# Patient Record
Sex: MALE | Race: WHITE | Employment: OTHER | URBAN - METROPOLITAN AREA
[De-identification: names, ages, dates, MRNs, and addresses within clinical notes are randomized per-mention and may not be internally consistent; named-entity substitution may affect disease eponyms.]

---

## 2019-01-18 ENCOUNTER — HOSPITAL ENCOUNTER (EMERGENCY)
Age: 39
Discharge: HOME | End: 2019-01-18
Attending: EMERGENCY MEDICINE
Payer: COMMERCIAL

## 2019-01-18 ENCOUNTER — APPOINTMENT (OUTPATIENT)
Dept: ULTRASOUND IMAGING | Age: 39
End: 2019-01-18
Payer: COMMERCIAL

## 2019-01-18 VITALS
HEART RATE: 107 BPM | RESPIRATION RATE: 16 BRPM | OXYGEN SATURATION: 99 % | TEMPERATURE: 98.1 F | WEIGHT: 153.4 LBS | SYSTOLIC BLOOD PRESSURE: 152 MMHG | DIASTOLIC BLOOD PRESSURE: 108 MMHG | BODY MASS INDEX: 20.33 KG/M2 | HEIGHT: 73 IN

## 2019-01-18 DIAGNOSIS — N50.89 TESTICULAR SWELLING, RIGHT: ICD-10-CM

## 2019-01-18 DIAGNOSIS — N45.1 EPIDIDYMITIS: Primary | ICD-10-CM

## 2019-01-18 PROCEDURE — 93975 VASCULAR STUDY: CPT

## 2019-01-18 PROCEDURE — 76870 US EXAM SCROTUM: CPT

## 2019-01-18 PROCEDURE — 99284 EMERGENCY DEPT VISIT MOD MDM: CPT

## 2019-01-18 PROCEDURE — 6370000000 HC RX 637 (ALT 250 FOR IP): Performed by: EMERGENCY MEDICINE

## 2019-01-18 RX ORDER — LEVOFLOXACIN 500 MG/1
500 TABLET, FILM COATED ORAL DAILY
Qty: 10 TABLET | Refills: 0 | Status: SHIPPED | OUTPATIENT
Start: 2019-01-18 | End: 2019-01-28

## 2019-01-18 RX ORDER — OXYCODONE HYDROCHLORIDE 5 MG/1
5 TABLET ORAL ONCE
Status: COMPLETED | OUTPATIENT
Start: 2019-01-18 | End: 2019-01-18

## 2019-01-18 RX ORDER — OXYCODONE HYDROCHLORIDE 5 MG/1
5 TABLET ORAL EVERY 6 HOURS PRN
Qty: 8 TABLET | Refills: 0 | Status: SHIPPED | OUTPATIENT
Start: 2019-01-18 | End: 2019-01-20

## 2019-01-18 RX ADMIN — IBUPROFEN 600 MG: 400 TABLET ORAL at 18:38

## 2019-01-18 RX ADMIN — OXYCODONE HYDROCHLORIDE 5 MG: 5 TABLET ORAL at 18:38

## 2019-01-18 ASSESSMENT — PAIN SCALES - GENERAL
PAINLEVEL_OUTOF10: 9
PAINLEVEL_OUTOF10: 9

## 2019-01-18 ASSESSMENT — PAIN DESCRIPTION - LOCATION: LOCATION: GROIN

## 2019-01-18 ASSESSMENT — PAIN DESCRIPTION - PAIN TYPE: TYPE: ACUTE PAIN

## 2019-01-18 ASSESSMENT — PAIN DESCRIPTION - DESCRIPTORS: DESCRIPTORS: THROBBING;SHARP

## 2019-01-18 ASSESSMENT — PAIN DESCRIPTION - FREQUENCY: FREQUENCY: CONTINUOUS

## 2019-01-24 ASSESSMENT — ENCOUNTER SYMPTOMS
ABDOMINAL PAIN: 0
BACK PAIN: 0

## 2019-07-12 ENCOUNTER — HOSPITAL ENCOUNTER (EMERGENCY)
Age: 39
Discharge: LEFT BEFORE DISCHARGE | End: 2019-07-12
Attending: EMERGENCY MEDICINE
Payer: COMMERCIAL

## 2019-07-12 ENCOUNTER — APPOINTMENT (OUTPATIENT)
Dept: CT IMAGING | Age: 39
End: 2019-07-12
Payer: COMMERCIAL

## 2019-07-12 VITALS
HEART RATE: 108 BPM | TEMPERATURE: 98.4 F | BODY MASS INDEX: 20.36 KG/M2 | DIASTOLIC BLOOD PRESSURE: 110 MMHG | OXYGEN SATURATION: 100 % | WEIGHT: 153.6 LBS | RESPIRATION RATE: 18 BRPM | HEIGHT: 73 IN | SYSTOLIC BLOOD PRESSURE: 175 MMHG

## 2019-07-12 DIAGNOSIS — A41.9 SEPSIS, DUE TO UNSPECIFIED ORGANISM: Primary | ICD-10-CM

## 2019-07-12 DIAGNOSIS — N12 PYELONEPHRITIS: ICD-10-CM

## 2019-07-12 DIAGNOSIS — N41.1 CHRONIC PROSTATITIS: ICD-10-CM

## 2019-07-12 LAB
A/G RATIO: 1.1 (ref 1.1–2.2)
ALBUMIN SERPL-MCNC: 4.7 G/DL (ref 3.4–5)
ALP BLD-CCNC: 97 U/L (ref 40–129)
ALT SERPL-CCNC: 29 U/L (ref 10–40)
ANION GAP SERPL CALCULATED.3IONS-SCNC: 29 MMOL/L (ref 3–16)
ANISOCYTOSIS: ABNORMAL
AST SERPL-CCNC: 74 U/L (ref 15–37)
BACTERIA: ABNORMAL /HPF
BASOPHILS ABSOLUTE: 0 K/UL (ref 0–0.2)
BASOPHILS RELATIVE PERCENT: 0.5 %
BILIRUB SERPL-MCNC: 1.6 MG/DL (ref 0–1)
BILIRUBIN URINE: NEGATIVE
BLOOD, URINE: NEGATIVE
BUN BLDV-MCNC: 9 MG/DL (ref 7–20)
CALCIUM SERPL-MCNC: 9.4 MG/DL (ref 8.3–10.6)
CHLORIDE BLD-SCNC: 91 MMOL/L (ref 99–110)
CLARITY: CLEAR
CO2: 13 MMOL/L (ref 21–32)
COLOR: ABNORMAL
CREAT SERPL-MCNC: 0.6 MG/DL (ref 0.9–1.3)
EOSINOPHILS ABSOLUTE: 0 K/UL (ref 0–0.6)
EOSINOPHILS RELATIVE PERCENT: 0.7 %
EPITHELIAL CELLS, UA: ABNORMAL /HPF
GFR AFRICAN AMERICAN: >60
GFR NON-AFRICAN AMERICAN: >60
GLOBULIN: 4.1 G/DL
GLUCOSE BLD-MCNC: 87 MG/DL (ref 70–99)
GLUCOSE URINE: NEGATIVE MG/DL
HCT VFR BLD CALC: 47.2 % (ref 40.5–52.5)
HEMATOLOGY PATH CONSULT: YES
HEMOGLOBIN: 15.4 G/DL (ref 13.5–17.5)
KETONES, URINE: >=80 MG/DL
LACTIC ACID, SEPSIS: 5.3 MMOL/L (ref 0.4–1.9)
LEUKOCYTE ESTERASE, URINE: ABNORMAL
LIPASE: 35 U/L (ref 13–60)
LYMPHOCYTES ABSOLUTE: 0.7 K/UL (ref 1–5.1)
LYMPHOCYTES RELATIVE PERCENT: 10.8 %
MACROCYTES: ABNORMAL
MCH RBC QN AUTO: 36.5 PG (ref 26–34)
MCHC RBC AUTO-ENTMCNC: 32.7 G/DL (ref 31–36)
MCV RBC AUTO: 111.4 FL (ref 80–100)
MICROSCOPIC EXAMINATION: YES
MONOCYTES ABSOLUTE: 0.4 K/UL (ref 0–1.3)
MONOCYTES RELATIVE PERCENT: 5.5 %
NEUTROPHILS ABSOLUTE: 5.6 K/UL (ref 1.7–7.7)
NEUTROPHILS RELATIVE PERCENT: 82.5 %
NITRITE, URINE: NEGATIVE
PDW BLD-RTO: 15.6 % (ref 12.4–15.4)
PH UA: 6 (ref 5–8)
PLATELET # BLD: 123 K/UL (ref 135–450)
PLATELET SLIDE REVIEW: ABNORMAL
PMV BLD AUTO: 7.9 FL (ref 5–10.5)
POTASSIUM REFLEX MAGNESIUM: 4.3 MMOL/L (ref 3.5–5.1)
PROTEIN UA: 30 MG/DL
RBC # BLD: 4.24 M/UL (ref 4.2–5.9)
RBC UA: ABNORMAL /HPF (ref 0–2)
SODIUM BLD-SCNC: 133 MMOL/L (ref 136–145)
SPECIFIC GRAVITY UA: >=1.03 (ref 1–1.03)
TOTAL PROTEIN: 8.8 G/DL (ref 6.4–8.2)
URINE TYPE: ABNORMAL
UROBILINOGEN, URINE: 1 E.U./DL
WBC # BLD: 6.7 K/UL (ref 4–11)
WBC UA: ABNORMAL /HPF (ref 0–5)

## 2019-07-12 PROCEDURE — 96367 TX/PROPH/DG ADDL SEQ IV INF: CPT

## 2019-07-12 PROCEDURE — 87491 CHLMYD TRACH DNA AMP PROBE: CPT

## 2019-07-12 PROCEDURE — 87086 URINE CULTURE/COLONY COUNT: CPT

## 2019-07-12 PROCEDURE — 74176 CT ABD & PELVIS W/O CONTRAST: CPT

## 2019-07-12 PROCEDURE — 6360000002 HC RX W HCPCS: Performed by: EMERGENCY MEDICINE

## 2019-07-12 PROCEDURE — 87186 SC STD MICRODIL/AGAR DIL: CPT

## 2019-07-12 PROCEDURE — 96365 THER/PROPH/DIAG IV INF INIT: CPT

## 2019-07-12 PROCEDURE — 80053 COMPREHEN METABOLIC PANEL: CPT

## 2019-07-12 PROCEDURE — 83690 ASSAY OF LIPASE: CPT

## 2019-07-12 PROCEDURE — 85025 COMPLETE CBC W/AUTO DIFF WBC: CPT

## 2019-07-12 PROCEDURE — 81001 URINALYSIS AUTO W/SCOPE: CPT

## 2019-07-12 PROCEDURE — 83605 ASSAY OF LACTIC ACID: CPT

## 2019-07-12 PROCEDURE — 87591 N.GONORRHOEAE DNA AMP PROB: CPT

## 2019-07-12 PROCEDURE — 87077 CULTURE AEROBIC IDENTIFY: CPT

## 2019-07-12 PROCEDURE — 87184 SC STD DISK METHOD PER PLATE: CPT

## 2019-07-12 PROCEDURE — 51701 INSERT BLADDER CATHETER: CPT

## 2019-07-12 PROCEDURE — 2580000003 HC RX 258: Performed by: EMERGENCY MEDICINE

## 2019-07-12 PROCEDURE — 99284 EMERGENCY DEPT VISIT MOD MDM: CPT

## 2019-07-12 PROCEDURE — 51798 US URINE CAPACITY MEASURE: CPT

## 2019-07-12 PROCEDURE — 96375 TX/PRO/DX INJ NEW DRUG ADDON: CPT

## 2019-07-12 RX ORDER — CIPROFLOXACIN 2 MG/ML
400 INJECTION, SOLUTION INTRAVENOUS ONCE
Status: COMPLETED | OUTPATIENT
Start: 2019-07-12 | End: 2019-07-12

## 2019-07-12 RX ORDER — KETOROLAC TROMETHAMINE 30 MG/ML
30 INJECTION, SOLUTION INTRAMUSCULAR; INTRAVENOUS ONCE
Status: COMPLETED | OUTPATIENT
Start: 2019-07-12 | End: 2019-07-12

## 2019-07-12 RX ORDER — 0.9 % SODIUM CHLORIDE 0.9 %
1000 INTRAVENOUS SOLUTION INTRAVENOUS ONCE
Status: COMPLETED | OUTPATIENT
Start: 2019-07-12 | End: 2019-07-12

## 2019-07-12 RX ORDER — ONDANSETRON 2 MG/ML
8 INJECTION INTRAMUSCULAR; INTRAVENOUS ONCE
Status: COMPLETED | OUTPATIENT
Start: 2019-07-12 | End: 2019-07-12

## 2019-07-12 RX ORDER — ONDANSETRON 4 MG/1
4 TABLET, ORALLY DISINTEGRATING ORAL EVERY 6 HOURS PRN
Qty: 20 TABLET | Refills: 0 | Status: SHIPPED | OUTPATIENT
Start: 2019-07-12 | End: 2021-11-26

## 2019-07-12 RX ORDER — CEFDINIR 300 MG/1
300 CAPSULE ORAL 2 TIMES DAILY
Qty: 56 CAPSULE | Refills: 0 | Status: SHIPPED | OUTPATIENT
Start: 2019-07-12 | End: 2019-08-09

## 2019-07-12 RX ORDER — CIPROFLOXACIN 500 MG/1
500 TABLET, FILM COATED ORAL 2 TIMES DAILY
Qty: 56 TABLET | Refills: 0 | Status: SHIPPED | OUTPATIENT
Start: 2019-07-12 | End: 2019-08-09

## 2019-07-12 RX ADMIN — CEFTRIAXONE 1 G: 1 INJECTION, POWDER, FOR SOLUTION INTRAMUSCULAR; INTRAVENOUS at 15:55

## 2019-07-12 RX ADMIN — CIPROFLOXACIN 400 MG: 2 INJECTION, SOLUTION INTRAVENOUS at 16:44

## 2019-07-12 RX ADMIN — SODIUM CHLORIDE 1000 ML: 9 INJECTION, SOLUTION INTRAVENOUS at 17:11

## 2019-07-12 RX ADMIN — KETOROLAC TROMETHAMINE 30 MG: 30 INJECTION, SOLUTION INTRAMUSCULAR at 15:55

## 2019-07-12 RX ADMIN — ONDANSETRON 8 MG: 2 INJECTION INTRAMUSCULAR; INTRAVENOUS at 15:55

## 2019-07-12 RX ADMIN — SODIUM CHLORIDE 1000 ML: 9 INJECTION, SOLUTION INTRAVENOUS at 15:55

## 2019-07-12 ASSESSMENT — PAIN SCALES - GENERAL
PAINLEVEL_OUTOF10: 5
PAINLEVEL_OUTOF10: 8

## 2019-07-12 ASSESSMENT — PAIN DESCRIPTION - PAIN TYPE: TYPE: ACUTE PAIN

## 2019-07-12 ASSESSMENT — PAIN DESCRIPTION - LOCATION: LOCATION: FLANK

## 2019-07-12 ASSESSMENT — PAIN DESCRIPTION - DESCRIPTORS: DESCRIPTORS: DULL

## 2019-07-12 ASSESSMENT — PAIN DESCRIPTION - FREQUENCY: FREQUENCY: CONTINUOUS

## 2019-07-12 NOTE — ED NOTES
attempted to place grijalva was able to advance Dr. John Malin attempted to place a coude and was unable     Frank Leyva RN  07/12/19 7416

## 2019-07-12 NOTE — ED PROVIDER NOTES
there may be inadvertent errors in transcription of spellings and words despite physician's attempts to correct all possible errors.          Ashwin Velasco MD  07/12/19 0146

## 2019-07-14 LAB — HEMATOLOGY PATH CONSULT: NORMAL

## 2019-07-15 LAB
C. TRACHOMATIS DNA ,URINE: NEGATIVE
N. GONORRHOEAE DNA, URINE: NEGATIVE

## 2019-07-16 LAB
ORGANISM: ABNORMAL
URINE CULTURE, ROUTINE: ABNORMAL
URINE CULTURE, ROUTINE: ABNORMAL

## 2019-10-22 ENCOUNTER — HOSPITAL ENCOUNTER (EMERGENCY)
Age: 39
Discharge: HOME | End: 2019-10-22
Attending: EMERGENCY MEDICINE
Payer: COMMERCIAL

## 2019-10-22 VITALS
SYSTOLIC BLOOD PRESSURE: 135 MMHG | RESPIRATION RATE: 14 BRPM | DIASTOLIC BLOOD PRESSURE: 98 MMHG | BODY MASS INDEX: 20.63 KG/M2 | WEIGHT: 155.7 LBS | OXYGEN SATURATION: 99 % | TEMPERATURE: 97.7 F | HEART RATE: 110 BPM | HEIGHT: 73 IN

## 2019-10-22 DIAGNOSIS — N30.00 ACUTE CYSTITIS WITHOUT HEMATURIA: Primary | ICD-10-CM

## 2019-10-22 DIAGNOSIS — R33.9 URINARY RETENTION: ICD-10-CM

## 2019-10-22 LAB
BACTERIA: ABNORMAL /HPF
BILIRUBIN URINE: NEGATIVE
BLOOD, URINE: NEGATIVE
CLARITY: CLEAR
COLOR: YELLOW
EPITHELIAL CELLS, UA: ABNORMAL /HPF
GLUCOSE URINE: NEGATIVE MG/DL
KETONES, URINE: NEGATIVE MG/DL
LEUKOCYTE ESTERASE, URINE: ABNORMAL
MICROSCOPIC EXAMINATION: YES
NITRITE, URINE: POSITIVE
PH UA: 7 (ref 5–8)
PROTEIN UA: NEGATIVE MG/DL
RBC UA: ABNORMAL /HPF (ref 0–2)
SPECIFIC GRAVITY UA: <=1.005 (ref 1–1.03)
URINE TYPE: ABNORMAL
UROBILINOGEN, URINE: 0.2 E.U./DL
WBC UA: ABNORMAL /HPF (ref 0–5)

## 2019-10-22 PROCEDURE — 99283 EMERGENCY DEPT VISIT LOW MDM: CPT

## 2019-10-22 PROCEDURE — 81001 URINALYSIS AUTO W/SCOPE: CPT

## 2019-10-22 PROCEDURE — 87086 URINE CULTURE/COLONY COUNT: CPT

## 2019-10-22 RX ORDER — NITROFURANTOIN 25; 75 MG/1; MG/1
100 CAPSULE ORAL 2 TIMES DAILY
Qty: 10 CAPSULE | Refills: 0 | Status: SHIPPED | OUTPATIENT
Start: 2019-10-22 | End: 2019-10-27

## 2019-10-22 RX ORDER — TAMSULOSIN HYDROCHLORIDE 0.4 MG/1
CAPSULE ORAL
Refills: 4 | COMMUNITY
Start: 2019-09-10 | End: 2021-11-26

## 2019-10-24 LAB — URINE CULTURE, ROUTINE: NORMAL

## 2021-11-26 ENCOUNTER — HOSPITAL ENCOUNTER (EMERGENCY)
Age: 41
Discharge: LEFT BEFORE DISCHARGE | End: 2021-11-27
Attending: EMERGENCY MEDICINE
Payer: COMMERCIAL

## 2021-11-26 VITALS
HEIGHT: 73 IN | TEMPERATURE: 98.4 F | SYSTOLIC BLOOD PRESSURE: 136 MMHG | HEART RATE: 106 BPM | RESPIRATION RATE: 16 BRPM | BODY MASS INDEX: 21.63 KG/M2 | WEIGHT: 163.2 LBS | DIASTOLIC BLOOD PRESSURE: 107 MMHG | OXYGEN SATURATION: 100 %

## 2021-11-26 DIAGNOSIS — N39.0 URINARY TRACT INFECTION WITHOUT HEMATURIA, SITE UNSPECIFIED: Primary | ICD-10-CM

## 2021-11-26 LAB
BACTERIA: ABNORMAL /HPF
BILIRUBIN URINE: NEGATIVE
BLOOD, URINE: ABNORMAL
CLARITY: ABNORMAL
COLOR: YELLOW
EPITHELIAL CELLS, UA: ABNORMAL /HPF (ref 0–5)
GLUCOSE URINE: NEGATIVE MG/DL
KETONES, URINE: NEGATIVE MG/DL
LEUKOCYTE ESTERASE, URINE: ABNORMAL
MICROSCOPIC EXAMINATION: YES
NITRITE, URINE: POSITIVE
PH UA: 7.5 (ref 5–8)
PROTEIN UA: NEGATIVE MG/DL
RBC UA: ABNORMAL /HPF (ref 0–4)
SPECIFIC GRAVITY UA: <=1.005 (ref 1–1.03)
URINE REFLEX TO CULTURE: YES
URINE TYPE: ABNORMAL
UROBILINOGEN, URINE: 0.2 E.U./DL
WBC UA: ABNORMAL /HPF (ref 0–5)

## 2021-11-26 PROCEDURE — 99283 EMERGENCY DEPT VISIT LOW MDM: CPT

## 2021-11-26 PROCEDURE — 87086 URINE CULTURE/COLONY COUNT: CPT

## 2021-11-26 PROCEDURE — 81001 URINALYSIS AUTO W/SCOPE: CPT

## 2021-11-26 ASSESSMENT — PAIN DESCRIPTION - FREQUENCY: FREQUENCY: CONTINUOUS

## 2021-11-26 ASSESSMENT — PAIN DESCRIPTION - PAIN TYPE: TYPE: ACUTE PAIN

## 2021-11-26 ASSESSMENT — PAIN DESCRIPTION - LOCATION: LOCATION: ABDOMEN

## 2021-11-26 ASSESSMENT — PAIN DESCRIPTION - ORIENTATION: ORIENTATION: LEFT;LOWER

## 2021-11-26 ASSESSMENT — PAIN DESCRIPTION - DESCRIPTORS: DESCRIPTORS: PRESSURE

## 2021-11-26 ASSESSMENT — PAIN SCALES - GENERAL: PAINLEVEL_OUTOF10: 7

## 2021-11-26 NOTE — LETTER
[facility] Emergency Department  [address] [place] [id number]  Phone: [phone]  Fax: [phone]    Patient: Jimmy Vasqueza: 1980  Date: 11/27/2021 Time: 12:15 AM    Leaving the [address] [place] [address] Advice    Chart #:[id number]    This will certify that I, the undersigned,    ______________________________________________________________________    A patient in the above named medical center, having requested discharge and removal from the medical center against the advice of my attending physician(s), hereby release the Emergency Department, its physicians, officers and employees, severally and individually, from any and all liability of any nature whatsoever for any injury or harm or complication of any kind that may result directly or indirectly, by reason of my terminating my stay as a patient from [facility], and hereby waive any and all rights of action I may now have or later acquire as a result of my voluntary departure from [facility] and the termination of my stay as a patient therein. This release is made with the full knowledge of the danger that may result from the action which I am taking.       Date:_______________________                         ___________________________                                                                                    Patient/Legal Representative    Witness:        ____________________________                          ___________________________  Nurse                                                                        Physician

## 2021-11-27 PROCEDURE — 6370000000 HC RX 637 (ALT 250 FOR IP): Performed by: EMERGENCY MEDICINE

## 2021-11-27 RX ORDER — ONDANSETRON 4 MG/1
4 TABLET, ORALLY DISINTEGRATING ORAL EVERY 8 HOURS PRN
Qty: 15 TABLET | Refills: 0 | Status: SHIPPED | OUTPATIENT
Start: 2021-11-27

## 2021-11-27 RX ORDER — CEFUROXIME AXETIL 250 MG/1
500 TABLET ORAL ONCE
Status: COMPLETED | OUTPATIENT
Start: 2021-11-27 | End: 2021-11-27

## 2021-11-27 RX ORDER — CIPROFLOXACIN 500 MG/1
500 TABLET, FILM COATED ORAL 2 TIMES DAILY
Qty: 20 TABLET | Refills: 0 | Status: SHIPPED | OUTPATIENT
Start: 2021-11-27 | End: 2021-12-07

## 2021-11-27 RX ORDER — CIPROFLOXACIN 500 MG/1
500 TABLET, FILM COATED ORAL ONCE
Status: COMPLETED | OUTPATIENT
Start: 2021-11-27 | End: 2021-11-27

## 2021-11-27 RX ORDER — CEFUROXIME AXETIL 500 MG/1
500 TABLET ORAL 2 TIMES DAILY
Qty: 20 TABLET | Refills: 0 | Status: SHIPPED | OUTPATIENT
Start: 2021-11-27 | End: 2021-12-07

## 2021-11-27 RX ADMIN — CIPROFLOXACIN 500 MG: 500 TABLET, FILM COATED ORAL at 00:23

## 2021-11-27 RX ADMIN — CEFUROXIME AXETIL 500 MG: 250 TABLET, FILM COATED ORAL at 00:23

## 2021-11-27 NOTE — ED NOTES
Pt dc/d AMA with instructions and rx's in stable condition, ambulatory to lobby. Home per ride.       Mercedez Gilmore RN  11/27/21 1772

## 2021-11-27 NOTE — ED PROVIDER NOTES
[facility]  EMERGENCY DEPT VISIT      Patient Identification  Lindi Goltz is a 42 y.o. male. Chief Complaint   Urinary Tract Infection (states has multilpe kidney stones and just need Cipro)      History of Present Illness: This is a  42 y.o. male who presents ambulatory  to the ED with complaints of 2 WEEK history of LLQ pain with frequency and urgency of urination with dysuria. Patient reports a chronic history of kidney stones and recurrent UTIs for which she has had several surgeries for in the past.  He has not recently been seeing a urologist.  For the last 2 weeks he has felt like another urinary tract infection has been coming on and he states that he has been passing small stones. He shows a picture of several 2 to 3 mm stones that he states he passed. He denies documented fever but has been having chills and sweats and body aches. He has had some intermittent nausea and vomiting particularly in the morning. He has chronic diarrhea. He denies flank pain and states that he knows it is getting bad when his flank starts hurting or when he is unable to urinate at all. He came to the emergency department because he wanted a prescription for some Cipro. He states that this is what has helped him in the past and that he has been able to pass the stone so does not feel that he is acutely ill. He has not been compliant with urologic or PCP followup. Past Medical History:   Diagnosis Date    Bladder infection     Chronic kidney disease     ETOH abuse        Past Surgical History:   Procedure Laterality Date    CYSTOSCOPY  10/04/2016    CYSTOSCOPY, DVIU, LEFT RETROGRADE AND STENT PLACMENT    KIDNEY SURGERY  kidney stones       No current facility-administered medications for this encounter.     Current Outpatient Medications:     cefUROXime (CEFTIN) 500 MG tablet, Take 1 tablet by mouth 2 times daily for 10 days, Disp: 20 tablet, Rfl: 0    ciprofloxacin (CIPRO) 500 MG tablet, Take 1 tablet by mouth 2 times daily for 10 days, Disp: 20 tablet, Rfl: 0    ondansetron (ZOFRAN ODT) 4 MG disintegrating tablet, Take 1 tablet by mouth every 8 hours as needed for Nausea or Vomiting, Disp: 15 tablet, Rfl: 0    Allergies   Allergen Reactions    Ampicillin        Social History     Socioeconomic History    Marital status: Legally      Spouse name: Not on file    Number of children: Not on file    Years of education: Not on file    Highest education level: Not on file   Occupational History    Not on file   Tobacco Use    Smoking status: Current Every Day Smoker     Packs/day: 1.00     Types: Cigarettes    Smokeless tobacco: Never Used   Substance and Sexual Activity    Alcohol use: Yes     Comment: ETOH Abuse - 5 shots every other day of whiskey     Drug use: Not Currently     Types: Marijuana Shanice Jose Enrique)    Sexual activity: Yes     Partners: Female   Other Topics Concern    Not on file   Social History Narrative    Not on file     Social Determinants of Health     Financial Resource Strain:     Difficulty of Paying Living Expenses: Not on file   Food Insecurity:     Worried About Running Out of Food in the Last Year: Not on file    Parth of Food in the Last Year: Not on file   Transportation Needs:     Lack of Transportation (Medical): Not on file    Lack of Transportation (Non-Medical):  Not on file   Physical Activity:     Days of Exercise per Week: Not on file    Minutes of Exercise per Session: Not on file   Stress:     Feeling of Stress : Not on file   Social Connections:     Frequency of Communication with Friends and Family: Not on file    Frequency of Social Gatherings with Friends and Family: Not on file    Attends [employer] Services: Not on file    Active Member of Clubs or Organizations: Not on file    Attends Club or Organization Meetings: Not on file    Marital Status: Not on file   Intimate Partner Violence:     Fear of Current or Ex-Partner: Not on file   Yara Emotionally Abused: Not on file    Physically Abused: Not on file    Sexually Abused: Not on file   Housing Stability:     Unable to Pay for Housing in the Last Year: Not on file    Number of Places Lived in the Last Year: Not on file    Unstable Housing in the Last Year: Not on file       Nursing Notes Reviewed      ROS:  GENERAL:  No fever, +chills, + diaphoresis, no appetite changes  EYES: no eye discharge, no eye redness, no visual changes  ENT: no nasal congestion, no sore throat  CARDIAC: no chest pain, nno leg swelling  PULM: no cough, no shortness of breath  ABD: + abdominal pain, + nausea, + vomiting, +diarrhea, no melena or hematochezia  : + dysuria, no hematuria, + urgency,+ frequency. No flank pain  MUSCULOSKELETAL: no back pain, no arthralgias, + myalgias  NEURO: no headache, no lightheadedness, no dizziness, no numbness, no weakness, no syncope  SKIN: no rashes, no erythema, no wounds, no ecchymosis      PHYSICAL EXAM:  GENERAL APPEARANCE: Darvin Schwab is in no acute respiratory distress. Awake and alert. VITAL SIGNS:   ED Triage Vitals [11/26/21 2145]   Enc Vitals Group      BP (!) 136/107      Pulse 106      Resp 16      Temp 98.4 °F (36.9 °C)      Temp Source Oral      SpO2 100 %      Weight 163 lb 3.2 oz (74 kg)      Height 6' 1\" (1.854 m)      Head Circumference       Peak Flow       Pain Score       Pain Loc       Pain Edu? Excl. in [address]? HEAD: Normocephalic, atraumatic. EYES:  Extraocular muscles are intact. Pupils equal round and reactive to light. Conjunctivas are pink. Negative scleral icterus. ENT:  Mucous membranes are moist.  Pharynx without erythema or exudates. NECK: Nontender and supple. No cervical adenopathy. CHEST:  Clear to auscultation bilaterally. No rales, rhonchi, or wheezing. HEART:  Mildly tachycardic rate and regular rhythm. No murmurs. Strong and equal pulses in the upper and lower extremities.    ABDOMEN: Soft,  nondistended, positive bowel sounds. abdomen is mildly tender in LLQ. No rebound. no guarding. No flank tenderness  MUSCULOSKELETAL: The calves are nontender to palpation. Active range of motion of the upper and lower extremities. No edema. NEUROLOGICAL: Awake, alert and oriented x 3. Power intact in the upper and lower extremities. DERMATOLOGIC: No petechiae, rashes, or ecchymoses. No erythema. PSYCH: normal mood and affect. Normal thought content. ED COURSE AND MEDICAL DECISION MAKING:      Radiology:  Films have been read by radiologist as noted in chart unless otherwise stated. Other radiologic studies (i.e. CT, MRI, ultrasounds, etc ) have been interpreted by radiologist.     No orders to display       Labs:  Results for orders placed or performed during the hospital encounter of 11/26/21   Urinalysis Reflex to Culture    Specimen: Urine, clean catch   Result Value Ref Range    Color, UA Yellow Straw/Yellow    Clarity, UA CLOUDY (A) Clear    Glucose, Ur Negative Negative mg/dL    Bilirubin Urine Negative Negative    Ketones, Urine Negative Negative mg/dL    Specific Gravity, UA <=1.005 1.005 - 1.030    Blood, Urine TRACE (A) Negative    pH, UA 7.5 5.0 - 8.0    Protein, UA Negative Negative mg/dL    Urobilinogen, Urine 0.2 <2.0 E.U./dL    Nitrite, Urine POSITIVE (A) Negative    Leukocyte Esterase, Urine LARGE (A) Negative    Microscopic Examination YES     Urine Type NotGiven     Urine Reflex to Culture Yes    Microscopic Urinalysis   Result Value Ref Range    WBC, UA 10-20 (A) 0 - 5 /HPF    RBC, UA 0-2 0 - 4 /HPF    Epithelial Cells, UA 2-5 0 - 5 /HPF    Bacteria, UA 4+ (A) None Seen /HPF       Treatment in the department:  Patient received the following while in the ED.     Medications   ciprofloxacin (CIPRO) tablet 500 mg (500 mg Oral Given 11/27/21 0023)   cefUROXime (CEFTIN) tablet 500 mg (500 mg Oral Given 11/27/21 0023)       Medical decision making:  Patient presents emergency department with a long history of kidney stones including staghorn stones, recurrent UTIs, urosepsis, and what sounds like nephrostomy tubes in the past.  He is currently complaining of a 2-week history of subjective fevers with sweats, chills, body aches associate with some left lower quadrant pain and urinary symptoms. Given his history and his tachycardia with sweats chills and body aches I have strongly recommended that we obtain IV access and give him IV antibiotics and evaluate him further with blood work and imaging to make sure that he was not obstructed or uroseptic. He has not had any blood work that I can find for the last 2 years including renal function. Patient refused blood work, IV antibiotics, imaging. Just wants prescription. He understands the risks of urosepsis and kidney failure and continues to insist on leaving with simply a script for antibiotics. I have encouraged him to followup with urology and return for worsening symptoms or if he changes his mind. His last urine culture showed resistance to cipro so double coverage with cephalosporin was also given. Clinical Impression:  1. Urinary tract infection without hematuria, site unspecified        Dispo:  Patient will be leaving AMA at this time    I discussed the nature and purpose, risks and benefits, as well as, the alternatives of the recommneded bloodwork, imaging, and IV antibiotics with Ovidio Mohan. Ovidio Mohan was given the time and opportunity to ask questions and consider their options, and after the discussion, Ovidio Mohan decided to refuse. I informed Ovidio Mohan that refusal could lead to, but was not limited to, death, permanent disability, or severe pain. If present, I asked the relatives or significant others of Ovidio Kimberlee to dissuade them without success. Prior to refusing, their nurse and I determined and agreed that Ovidio Mohan had the capacity to make this decision and understood the consequences of that decision.  Ovidio Mohan signed the refusal of treatment form and their nurse signed the form agreeing that the patient/guardian had received informed consent. After refusal, I made every reasonable opportunity to treat Milton Singh to the best of my ability. Discharge vitals:  Blood pressure (!) 136/107, pulse 106, temperature 98.4 °F (36.9 °C), temperature source Oral, resp. rate 16, height 6' 1\" (1.854 m), weight 163 lb 3.2 oz (74 kg), SpO2 100 %. Prescriptions given:   Discharge Medication List as of 11/27/2021 12:16 AM      START taking these medications    Details   cefUROXime (CEFTIN) 500 MG tablet Take 1 tablet by mouth 2 times daily for 10 days, Disp-20 tablet, R-0Print      ciprofloxacin (CIPRO) 500 MG tablet Take 1 tablet by mouth 2 times daily for 10 days, Disp-20 tablet, R-0Print               This chart was created using Dragon voice recognition software.         Bob Cooney MD  11/27/21 5495

## 2021-11-28 LAB — URINE CULTURE, ROUTINE: NORMAL

## 2022-01-01 ENCOUNTER — APPOINTMENT (OUTPATIENT)
Dept: CT IMAGING | Age: 42
DRG: 720 | End: 2022-01-01
Payer: COMMERCIAL

## 2022-01-01 ENCOUNTER — APPOINTMENT (OUTPATIENT)
Dept: GENERAL RADIOLOGY | Age: 42
DRG: 720 | End: 2022-01-01
Payer: COMMERCIAL

## 2022-01-01 ENCOUNTER — TELEPHONE (OUTPATIENT)
Dept: INTERNAL MEDICINE CLINIC | Age: 42
End: 2022-01-01

## 2022-01-01 ENCOUNTER — HOSPITAL ENCOUNTER (INPATIENT)
Age: 42
LOS: 1 days | DRG: 720 | End: 2022-12-29
Attending: STUDENT IN AN ORGANIZED HEALTH CARE EDUCATION/TRAINING PROGRAM | Admitting: INTERNAL MEDICINE
Payer: COMMERCIAL

## 2022-01-01 VITALS
HEIGHT: 73 IN | WEIGHT: 130.07 LBS | TEMPERATURE: 97.1 F | DIASTOLIC BLOOD PRESSURE: 23 MMHG | SYSTOLIC BLOOD PRESSURE: 50 MMHG | HEART RATE: 76 BPM | BODY MASS INDEX: 17.24 KG/M2 | OXYGEN SATURATION: 95 %

## 2022-01-01 DIAGNOSIS — I95.9 HYPOTENSION, UNSPECIFIED HYPOTENSION TYPE: ICD-10-CM

## 2022-01-01 DIAGNOSIS — J18.9 PNEUMONIA DUE TO INFECTIOUS ORGANISM, UNSPECIFIED LATERALITY, UNSPECIFIED PART OF LUNG: Primary | ICD-10-CM

## 2022-01-01 DIAGNOSIS — A41.9 SEPSIS, DUE TO UNSPECIFIED ORGANISM, UNSPECIFIED WHETHER ACUTE ORGAN DYSFUNCTION PRESENT (HCC): ICD-10-CM

## 2022-01-01 DIAGNOSIS — R00.0 TACHYCARDIA: ICD-10-CM

## 2022-01-01 LAB
A/G RATIO: 0.6 (ref 1.1–2.2)
ABO/RH: NORMAL
ACETAMINOPHEN LEVEL: <5 UG/ML (ref 10–30)
ALBUMIN SERPL-MCNC: 2.5 G/DL (ref 3.4–5)
ALBUMIN SERPL-MCNC: 3 G/DL (ref 3.4–5)
ALP BLD-CCNC: 285 U/L (ref 40–129)
ALT SERPL-CCNC: 22 U/L (ref 10–40)
AMMONIA: 109 UMOL/L (ref 16–60)
AMORPHOUS: ABNORMAL /HPF
ANION GAP SERPL CALCULATED.3IONS-SCNC: 26 MMOL/L (ref 3–16)
ANION GAP SERPL CALCULATED.3IONS-SCNC: 34 MMOL/L (ref 3–16)
ANISOCYTOSIS: ABNORMAL
ANISOCYTOSIS: ABNORMAL
ANTI-XA UNFRAC HEPARIN: <0.1 IU/ML (ref 0.3–0.7)
ANTIBODY SCREEN: NORMAL
AST SERPL-CCNC: 162 U/L (ref 15–37)
BACTERIA: ABNORMAL /HPF
BASE EXCESS ARTERIAL: -23 (ref -3–3)
BASE EXCESS VENOUS: -7 MMOL/L (ref -2–3)
BASOPHILS ABSOLUTE: 0 K/UL (ref 0–0.2)
BASOPHILS ABSOLUTE: 0 K/UL (ref 0–0.2)
BASOPHILS RELATIVE PERCENT: 0 %
BASOPHILS RELATIVE PERCENT: 0 %
BILIRUB SERPL-MCNC: 4.3 MG/DL (ref 0–1)
BILIRUBIN URINE: ABNORMAL
BLOOD, URINE: ABNORMAL
BUN BLDV-MCNC: 12 MG/DL (ref 7–20)
BUN BLDV-MCNC: 17 MG/DL (ref 7–20)
CALCIUM IONIZED: 1.21 MMOL/L (ref 1.12–1.32)
CALCIUM SERPL-MCNC: 8.4 MG/DL (ref 8.3–10.6)
CALCIUM SERPL-MCNC: 8.8 MG/DL (ref 8.3–10.6)
CARBOXYHEMOGLOBIN: 1.5 % (ref 0–1.5)
CHLORIDE BLD-SCNC: 87 MMOL/L (ref 99–110)
CHLORIDE BLD-SCNC: 88 MMOL/L (ref 99–110)
CLARITY: CLEAR
CO2: 16 MMOL/L (ref 21–32)
CO2: 8 MMOL/L (ref 21–32)
COLOR: YELLOW
CREAT SERPL-MCNC: 1.2 MG/DL (ref 0.9–1.3)
CREAT SERPL-MCNC: <0.5 MG/DL (ref 0.9–1.3)
EKG ATRIAL RATE: 129 BPM
EKG DIAGNOSIS: NORMAL
EKG P AXIS: 93 DEGREES
EKG P-R INTERVAL: 178 MS
EKG Q-T INTERVAL: 204 MS
EKG QRS DURATION: 86 MS
EKG QTC CALCULATION (BAZETT): 298 MS
EKG R AXIS: 66 DEGREES
EKG T AXIS: 15 DEGREES
EKG VENTRICULAR RATE: 129 BPM
EOSINOPHILS ABSOLUTE: 0 K/UL (ref 0–0.6)
EOSINOPHILS ABSOLUTE: 0.3 K/UL (ref 0–0.6)
EOSINOPHILS RELATIVE PERCENT: 0 %
EOSINOPHILS RELATIVE PERCENT: 1 %
EPITHELIAL CELLS, UA: ABNORMAL /HPF (ref 0–5)
ETHANOL: NORMAL MG/DL (ref 0–0.08)
GFR SERPL CREATININE-BSD FRML MDRD: >60 ML/MIN/{1.73_M2}
GFR SERPL CREATININE-BSD FRML MDRD: >60 ML/MIN/{1.73_M2}
GLUCOSE BLD-MCNC: 148 MG/DL (ref 70–99)
GLUCOSE BLD-MCNC: 150 MG/DL (ref 70–99)
GLUCOSE BLD-MCNC: 157 MG/DL (ref 70–99)
GLUCOSE URINE: NEGATIVE MG/DL
HCO3 ARTERIAL: 7.9 MMOL/L (ref 21–29)
HCO3 VENOUS: 18.6 MMOL/L (ref 24–28)
HCT VFR BLD CALC: 23.7 % (ref 40.5–52.5)
HCT VFR BLD CALC: 28.3 % (ref 40.5–52.5)
HEMATOLOGY PATH CONSULT: YES
HEMOGLOBIN, VEN, REDUCED: 40.6 %
HEMOGLOBIN: 7.2 G/DL (ref 13.5–17.5)
HEMOGLOBIN: 9.1 G/DL (ref 13.5–17.5)
HYPERCHROMASIA: ABNORMAL
HYPOCHROMIA: ABNORMAL
INFLUENZA A: NOT DETECTED
INFLUENZA B: NOT DETECTED
INR BLD: 1.29 (ref 0.87–1.14)
KETONES, URINE: ABNORMAL MG/DL
LACTATE: 17.88 MMOL/L (ref 0.4–2)
LACTIC ACID: 20.5 MMOL/L (ref 0.4–2)
LACTIC ACID: 5 MMOL/L (ref 0.4–2)
LACTIC ACID: 7.9 MMOL/L (ref 0.4–2)
LEUKOCYTE ESTERASE, URINE: ABNORMAL
LYMPHOCYTES ABSOLUTE: 0.8 K/UL (ref 1–5.1)
LYMPHOCYTES ABSOLUTE: 2 K/UL (ref 1–5.1)
LYMPHOCYTES RELATIVE PERCENT: 5 %
LYMPHOCYTES RELATIVE PERCENT: 8 %
MACROCYTES: ABNORMAL
MAGNESIUM: 2.4 MG/DL (ref 1.8–2.4)
MCH RBC QN AUTO: 33.5 PG (ref 26–34)
MCH RBC QN AUTO: 33.6 PG (ref 26–34)
MCHC RBC AUTO-ENTMCNC: 30.6 G/DL (ref 31–36)
MCHC RBC AUTO-ENTMCNC: 32.1 G/DL (ref 31–36)
MCV RBC AUTO: 104.4 FL (ref 80–100)
MCV RBC AUTO: 109.9 FL (ref 80–100)
METAMYELOCYTES RELATIVE PERCENT: 2 %
METHEMOGLOBIN VENOUS: 0 % (ref 0–1.5)
MICROCYTES: ABNORMAL
MICROSCOPIC EXAMINATION: YES
MONOCYTES ABSOLUTE: 0.9 K/UL (ref 0–1.3)
MONOCYTES ABSOLUTE: 1.8 K/UL (ref 0–1.3)
MONOCYTES RELATIVE PERCENT: 6 %
MONOCYTES RELATIVE PERCENT: 7 %
MYELOCYTE PERCENT: 4 %
NEUTROPHILS ABSOLUTE: 13.6 K/UL (ref 1.7–7.7)
NEUTROPHILS ABSOLUTE: 20.9 K/UL (ref 1.7–7.7)
NEUTROPHILS RELATIVE PERCENT: 77 %
NEUTROPHILS RELATIVE PERCENT: 89 %
NITRITE, URINE: NEGATIVE
O2 SAT, ARTERIAL: 100 % (ref 93–100)
O2 SAT, VEN: 59 %
OVALOCYTES: ABNORMAL
PCO2 ARTERIAL: 28.5 MM HG (ref 35–45)
PCO2, VEN: 36.8 MMHG (ref 41–51)
PDW BLD-RTO: 23.8 % (ref 12.4–15.4)
PDW BLD-RTO: 24.1 % (ref 12.4–15.4)
PERFORMED ON: ABNORMAL
PH ARTERIAL: 7.05 (ref 7.35–7.45)
PH UA: 6 (ref 5–8)
PH VENOUS: 7.31 (ref 7.35–7.45)
PHOSPHORUS: 7.1 MG/DL (ref 2.5–4.9)
PLASMA CELLS PERCENT: 1 %
PLATELET # BLD: 380 K/UL (ref 135–450)
PLATELET # BLD: 465 K/UL (ref 135–450)
PLATELET SLIDE REVIEW: ABNORMAL
PMV BLD AUTO: 7.7 FL (ref 5–10.5)
PMV BLD AUTO: 7.7 FL (ref 5–10.5)
PO2 ARTERIAL: 321.9 MM HG (ref 75–108)
PO2, VEN: 36.2 MMHG (ref 25–40)
POC POTASSIUM: 2.3 MMOL/L (ref 3.5–5.1)
POC SAMPLE TYPE: ABNORMAL
POC SODIUM: 130 MMOL/L (ref 136–145)
POIKILOCYTES: ABNORMAL
POLYCHROMASIA: ABNORMAL
POTASSIUM REFLEX MAGNESIUM: 2.6 MMOL/L (ref 3.5–5.1)
POTASSIUM REFLEX MAGNESIUM: 4.5 MMOL/L (ref 3.5–5.1)
POTASSIUM SERPL-SCNC: 2.6 MMOL/L (ref 3.5–5.1)
PRO-BNP: 523 PG/ML (ref 0–124)
PROTEIN UA: 100 MG/DL
PROTHROMBIN TIME: 16 SEC (ref 11.7–14.5)
RBC # BLD: 2.16 M/UL (ref 4.2–5.9)
RBC # BLD: 2.71 M/UL (ref 4.2–5.9)
RBC UA: ABNORMAL /HPF (ref 0–4)
RENAL EPITHELIAL, UA: ABNORMAL /HPF (ref 0–1)
SARS-COV-2 RNA, RT PCR: NOT DETECTED
SODIUM BLD-SCNC: 129 MMOL/L (ref 136–145)
SODIUM BLD-SCNC: 130 MMOL/L (ref 136–145)
SPECIFIC GRAVITY UA: 1.01 (ref 1–1.03)
SPHEROCYTES: ABNORMAL
TCO2 ARTERIAL: 9 MMOL/L
TCO2 CALC VENOUS: 20 MMOL/L
TOTAL PROTEIN: 8.2 G/DL (ref 6.4–8.2)
TROPONIN: <0.01 NG/ML
URINE TYPE: ABNORMAL
UROBILINOGEN, URINE: 4 E.U./DL
WBC # BLD: 15.3 K/UL (ref 4–11)
WBC # BLD: 25.2 K/UL (ref 4–11)
WBC UA: ABNORMAL /HPF (ref 0–5)

## 2022-01-01 PROCEDURE — 82803 BLOOD GASES ANY COMBINATION: CPT

## 2022-01-01 PROCEDURE — 2580000003 HC RX 258: Performed by: STUDENT IN AN ORGANIZED HEALTH CARE EDUCATION/TRAINING PROGRAM

## 2022-01-01 PROCEDURE — 84484 ASSAY OF TROPONIN QUANT: CPT

## 2022-01-01 PROCEDURE — 83735 ASSAY OF MAGNESIUM: CPT

## 2022-01-01 PROCEDURE — 2700000000 HC OXYGEN THERAPY PER DAY

## 2022-01-01 PROCEDURE — 83605 ASSAY OF LACTIC ACID: CPT

## 2022-01-01 PROCEDURE — 86900 BLOOD TYPING SEROLOGIC ABO: CPT

## 2022-01-01 PROCEDURE — 94761 N-INVAS EAR/PLS OXIMETRY MLT: CPT

## 2022-01-01 PROCEDURE — 82077 ASSAY SPEC XCP UR&BREATH IA: CPT

## 2022-01-01 PROCEDURE — 82947 ASSAY GLUCOSE BLOOD QUANT: CPT

## 2022-01-01 PROCEDURE — 94003 VENT MGMT INPAT SUBQ DAY: CPT

## 2022-01-01 PROCEDURE — 0BH17EZ INSERTION OF ENDOTRACHEAL AIRWAY INTO TRACHEA, VIA NATURAL OR ARTIFICIAL OPENING: ICD-10-PCS

## 2022-01-01 PROCEDURE — 6360000002 HC RX W HCPCS: Performed by: STUDENT IN AN ORGANIZED HEALTH CARE EDUCATION/TRAINING PROGRAM

## 2022-01-01 PROCEDURE — 83880 ASSAY OF NATRIURETIC PEPTIDE: CPT

## 2022-01-01 PROCEDURE — 82140 ASSAY OF AMMONIA: CPT

## 2022-01-01 PROCEDURE — 84132 ASSAY OF SERUM POTASSIUM: CPT

## 2022-01-01 PROCEDURE — 82330 ASSAY OF CALCIUM: CPT

## 2022-01-01 PROCEDURE — 96366 THER/PROPH/DIAG IV INF ADDON: CPT

## 2022-01-01 PROCEDURE — 85610 PROTHROMBIN TIME: CPT

## 2022-01-01 PROCEDURE — 85520 HEPARIN ASSAY: CPT

## 2022-01-01 PROCEDURE — 71045 X-RAY EXAM CHEST 1 VIEW: CPT

## 2022-01-01 PROCEDURE — 80053 COMPREHEN METABOLIC PANEL: CPT

## 2022-01-01 PROCEDURE — 2000000000 HC ICU R&B

## 2022-01-01 PROCEDURE — 92950 HEART/LUNG RESUSCITATION CPR: CPT

## 2022-01-01 PROCEDURE — 70450 CT HEAD/BRAIN W/O DYE: CPT

## 2022-01-01 PROCEDURE — 86901 BLOOD TYPING SEROLOGIC RH(D): CPT

## 2022-01-01 PROCEDURE — 81001 URINALYSIS AUTO W/SCOPE: CPT

## 2022-01-01 PROCEDURE — 87636 SARSCOV2 & INF A&B AMP PRB: CPT

## 2022-01-01 PROCEDURE — 93005 ELECTROCARDIOGRAM TRACING: CPT | Performed by: STUDENT IN AN ORGANIZED HEALTH CARE EDUCATION/TRAINING PROGRAM

## 2022-01-01 PROCEDURE — 99285 EMERGENCY DEPT VISIT HI MDM: CPT

## 2022-01-01 PROCEDURE — 6360000002 HC RX W HCPCS

## 2022-01-01 PROCEDURE — 31500 INSERT EMERGENCY AIRWAY: CPT

## 2022-01-01 PROCEDURE — 36415 COLL VENOUS BLD VENIPUNCTURE: CPT

## 2022-01-01 PROCEDURE — 87086 URINE CULTURE/COLONY COUNT: CPT

## 2022-01-01 PROCEDURE — 02HV33Z INSERTION OF INFUSION DEVICE INTO SUPERIOR VENA CAVA, PERCUTANEOUS APPROACH: ICD-10-PCS | Performed by: INTERNAL MEDICINE

## 2022-01-01 PROCEDURE — 87040 BLOOD CULTURE FOR BACTERIA: CPT

## 2022-01-01 PROCEDURE — 80069 RENAL FUNCTION PANEL: CPT

## 2022-01-01 PROCEDURE — 2580000003 HC RX 258

## 2022-01-01 PROCEDURE — 85025 COMPLETE CBC W/AUTO DIFF WBC: CPT

## 2022-01-01 PROCEDURE — 2500000003 HC RX 250 WO HCPCS

## 2022-01-01 PROCEDURE — 2500000003 HC RX 250 WO HCPCS: Performed by: STUDENT IN AN ORGANIZED HEALTH CARE EDUCATION/TRAINING PROGRAM

## 2022-01-01 PROCEDURE — 94002 VENT MGMT INPAT INIT DAY: CPT

## 2022-01-01 PROCEDURE — 5A1935Z RESPIRATORY VENTILATION, LESS THAN 24 CONSECUTIVE HOURS: ICD-10-PCS

## 2022-01-01 PROCEDURE — 96365 THER/PROPH/DIAG IV INF INIT: CPT

## 2022-01-01 PROCEDURE — 80143 DRUG ASSAY ACETAMINOPHEN: CPT

## 2022-01-01 PROCEDURE — 87088 URINE BACTERIA CULTURE: CPT

## 2022-01-01 PROCEDURE — 84295 ASSAY OF SERUM SODIUM: CPT

## 2022-01-01 PROCEDURE — 87186 SC STD MICRODIL/AGAR DIL: CPT

## 2022-01-01 PROCEDURE — 86850 RBC ANTIBODY SCREEN: CPT

## 2022-01-01 RX ORDER — AMIODARONE HYDROCHLORIDE 50 MG/ML
300 INJECTION, SOLUTION INTRAVENOUS ONCE
Status: COMPLETED | OUTPATIENT
Start: 2022-01-01 | End: 2022-01-01

## 2022-01-01 RX ORDER — EPINEPHRINE 0.1 MG/ML
1 SYRINGE (ML) INJECTION ONCE
Status: COMPLETED | OUTPATIENT
Start: 2022-01-01 | End: 2022-01-01

## 2022-01-01 RX ORDER — SODIUM CHLORIDE, SODIUM LACTATE, POTASSIUM CHLORIDE, AND CALCIUM CHLORIDE .6; .31; .03; .02 G/100ML; G/100ML; G/100ML; G/100ML
500 INJECTION, SOLUTION INTRAVENOUS ONCE
Status: COMPLETED | OUTPATIENT
Start: 2022-01-01 | End: 2022-01-01

## 2022-01-01 RX ORDER — PANTOPRAZOLE SODIUM 40 MG/1
40 TABLET, DELAYED RELEASE ORAL DAILY
Status: DISCONTINUED | OUTPATIENT
Start: 2022-01-01 | End: 2022-01-01

## 2022-01-01 RX ORDER — ACETAMINOPHEN 325 MG/1
650 TABLET ORAL EVERY 6 HOURS PRN
Status: DISCONTINUED | OUTPATIENT
Start: 2022-01-01 | End: 2022-01-01 | Stop reason: HOSPADM

## 2022-01-01 RX ORDER — THIAMINE HYDROCHLORIDE 100 MG/ML
100 INJECTION, SOLUTION INTRAMUSCULAR; INTRAVENOUS DAILY
Status: DISCONTINUED | OUTPATIENT
Start: 2022-01-01 | End: 2022-01-01

## 2022-01-01 RX ORDER — SODIUM CHLORIDE 9 MG/ML
INJECTION, SOLUTION INTRAVENOUS PRN
Status: DISCONTINUED | OUTPATIENT
Start: 2022-01-01 | End: 2022-01-01

## 2022-01-01 RX ORDER — FENTANYL CITRATE-0.9 % NACL/PF 10 MCG/ML
25-200 PLASTIC BAG, INJECTION (ML) INTRAVENOUS CONTINUOUS
Status: DISCONTINUED | OUTPATIENT
Start: 2022-01-01 | End: 2022-01-01

## 2022-01-01 RX ORDER — POTASSIUM CHLORIDE 29.8 MG/ML
INJECTION INTRAVENOUS
Status: DISCONTINUED
Start: 2022-01-01 | End: 2022-01-01

## 2022-01-01 RX ORDER — ACETAMINOPHEN 650 MG/1
650 SUPPOSITORY RECTAL EVERY 6 HOURS PRN
Status: DISCONTINUED | OUTPATIENT
Start: 2022-01-01 | End: 2022-01-01 | Stop reason: HOSPADM

## 2022-01-01 RX ORDER — ONDANSETRON 2 MG/ML
4 INJECTION INTRAMUSCULAR; INTRAVENOUS EVERY 6 HOURS PRN
Status: DISCONTINUED | OUTPATIENT
Start: 2022-01-01 | End: 2022-01-01 | Stop reason: HOSPADM

## 2022-01-01 RX ORDER — SODIUM CHLORIDE 0.9 % (FLUSH) 0.9 %
5-40 SYRINGE (ML) INJECTION PRN
Status: DISCONTINUED | OUTPATIENT
Start: 2022-01-01 | End: 2022-01-01 | Stop reason: HOSPADM

## 2022-01-01 RX ORDER — HEPARIN SODIUM 1000 [USP'U]/ML
80 INJECTION, SOLUTION INTRAVENOUS; SUBCUTANEOUS ONCE
Status: DISCONTINUED | OUTPATIENT
Start: 2022-01-01 | End: 2022-01-01

## 2022-01-01 RX ORDER — PROPOFOL 10 MG/ML
5-50 INJECTION, EMULSION INTRAVENOUS CONTINUOUS
Status: DISCONTINUED | OUTPATIENT
Start: 2022-01-01 | End: 2022-01-01

## 2022-01-01 RX ORDER — FOLIC ACID 1 MG/1
5 TABLET ORAL DAILY
Status: DISCONTINUED | OUTPATIENT
Start: 2022-01-01 | End: 2022-01-01

## 2022-01-01 RX ORDER — SODIUM CHLORIDE, SODIUM GLUCONATE, SODIUM ACETATE, POTASSIUM CHLORIDE AND MAGNESIUM CHLORIDE 526; 502; 368; 37; 30 MG/100ML; MG/100ML; MG/100ML; MG/100ML; MG/100ML
INJECTION, SOLUTION INTRAVENOUS CONTINUOUS
Status: DISCONTINUED | OUTPATIENT
Start: 2022-01-01 | End: 2022-01-01

## 2022-01-01 RX ORDER — POLYETHYLENE GLYCOL 3350 17 G/17G
17 POWDER, FOR SOLUTION ORAL DAILY PRN
Status: DISCONTINUED | OUTPATIENT
Start: 2022-01-01 | End: 2022-01-01

## 2022-01-01 RX ORDER — HEPARIN SODIUM 10000 [USP'U]/100ML
5-30 INJECTION, SOLUTION INTRAVENOUS CONTINUOUS
Status: DISCONTINUED | OUTPATIENT
Start: 2022-01-01 | End: 2022-01-01

## 2022-01-01 RX ORDER — ENOXAPARIN SODIUM 100 MG/ML
40 INJECTION SUBCUTANEOUS DAILY
Status: DISCONTINUED | OUTPATIENT
Start: 2022-01-01 | End: 2022-01-01

## 2022-01-01 RX ORDER — GLYCOPYRROLATE 0.2 MG/ML
0.2 INJECTION INTRAMUSCULAR; INTRAVENOUS EVERY 4 HOURS PRN
Status: DISCONTINUED | OUTPATIENT
Start: 2022-01-01 | End: 2022-01-01 | Stop reason: HOSPADM

## 2022-01-01 RX ORDER — POTASSIUM CHLORIDE 29.8 MG/ML
20 INJECTION INTRAVENOUS PRN
Status: DISCONTINUED | OUTPATIENT
Start: 2022-01-01 | End: 2022-01-01 | Stop reason: HOSPADM

## 2022-01-01 RX ORDER — HEPARIN SODIUM 1000 [USP'U]/ML
80 INJECTION, SOLUTION INTRAVENOUS; SUBCUTANEOUS PRN
Status: DISCONTINUED | OUTPATIENT
Start: 2022-01-01 | End: 2022-01-01

## 2022-01-01 RX ORDER — MAGNESIUM HYDROXIDE/ALUMINUM HYDROXICE/SIMETHICONE 120; 1200; 1200 MG/30ML; MG/30ML; MG/30ML
30 SUSPENSION ORAL EVERY 6 HOURS PRN
Status: DISCONTINUED | OUTPATIENT
Start: 2022-01-01 | End: 2022-01-01

## 2022-01-01 RX ORDER — LACTULOSE 10 G/15ML
20 SOLUTION ORAL 3 TIMES DAILY
Status: DISCONTINUED | OUTPATIENT
Start: 2022-01-01 | End: 2022-01-01

## 2022-01-01 RX ORDER — HEPARIN SODIUM 1000 [USP'U]/ML
40 INJECTION, SOLUTION INTRAVENOUS; SUBCUTANEOUS PRN
Status: DISCONTINUED | OUTPATIENT
Start: 2022-01-01 | End: 2022-01-01

## 2022-01-01 RX ORDER — GAUZE BANDAGE 2" X 2"
100 BANDAGE TOPICAL DAILY
Status: DISCONTINUED | OUTPATIENT
Start: 2022-01-01 | End: 2022-01-01

## 2022-01-01 RX ORDER — MORPHINE SULFATE 2 MG/ML
2 INJECTION, SOLUTION INTRAMUSCULAR; INTRAVENOUS
Status: DISCONTINUED | OUTPATIENT
Start: 2022-01-01 | End: 2022-01-01 | Stop reason: HOSPADM

## 2022-01-01 RX ORDER — SODIUM CHLORIDE 0.9 % (FLUSH) 0.9 %
5-40 SYRINGE (ML) INJECTION EVERY 12 HOURS SCHEDULED
Status: DISCONTINUED | OUTPATIENT
Start: 2022-01-01 | End: 2022-01-01

## 2022-01-01 RX ORDER — PANTOPRAZOLE SODIUM 40 MG/10ML
40 INJECTION, POWDER, LYOPHILIZED, FOR SOLUTION INTRAVENOUS DAILY
Status: DISCONTINUED | OUTPATIENT
Start: 2022-01-01 | End: 2022-01-01

## 2022-01-01 RX ORDER — ONDANSETRON 4 MG/1
4 TABLET, ORALLY DISINTEGRATING ORAL EVERY 8 HOURS PRN
Status: DISCONTINUED | OUTPATIENT
Start: 2022-01-01 | End: 2022-01-01 | Stop reason: HOSPADM

## 2022-01-01 RX ORDER — MIDAZOLAM HYDROCHLORIDE 1 MG/ML
0.5 INJECTION INTRAMUSCULAR; INTRAVENOUS
Status: DISCONTINUED | OUTPATIENT
Start: 2022-01-01 | End: 2022-01-01 | Stop reason: HOSPADM

## 2022-01-01 RX ORDER — TAMSULOSIN HYDROCHLORIDE 0.4 MG/1
0.4 CAPSULE ORAL DAILY
Status: DISCONTINUED | OUTPATIENT
Start: 2022-01-01 | End: 2022-01-01

## 2022-01-01 RX ADMIN — SODIUM BICARBONATE: 84 INJECTION, SOLUTION INTRAVENOUS at 02:15

## 2022-01-01 RX ADMIN — VASOPRESSIN 0.03 UNITS/MIN: 20 INJECTION INTRAVENOUS at 04:05

## 2022-01-01 RX ADMIN — AMIODARONE HYDROCHLORIDE 300 MG: 50 INJECTION, SOLUTION INTRAVENOUS at 21:20

## 2022-01-01 RX ADMIN — EPINEPHRINE 1 MCG/MIN: 1 INJECTION INTRAMUSCULAR; INTRAVENOUS; SUBCUTANEOUS at 21:46

## 2022-01-01 RX ADMIN — EPINEPHRINE 20 MCG/MIN: 1 INJECTION INTRAMUSCULAR; INTRAVENOUS; SUBCUTANEOUS at 02:41

## 2022-01-01 RX ADMIN — NOREPINEPHRINE BITARTRATE 100 MCG/MIN: 1 INJECTION, SOLUTION, CONCENTRATE INTRAVENOUS at 04:21

## 2022-01-01 RX ADMIN — VANCOMYCIN HYDROCHLORIDE 1250 MG: 10 INJECTION, POWDER, LYOPHILIZED, FOR SOLUTION INTRAVENOUS at 18:44

## 2022-01-01 RX ADMIN — EPINEPHRINE 1 MG: 0.1 INJECTION, SOLUTION ENDOTRACHEAL; INTRACARDIAC; INTRAVENOUS at 21:16

## 2022-01-01 RX ADMIN — EPINEPHRINE 1 MG: 0.1 INJECTION, SOLUTION ENDOTRACHEAL; INTRACARDIAC; INTRAVENOUS at 21:26

## 2022-01-01 RX ADMIN — POTASSIUM CHLORIDE 20 MEQ: 29.8 INJECTION, SOLUTION INTRAVENOUS at 01:56

## 2022-01-01 RX ADMIN — NOREPINEPHRINE BITARTRATE 5 MCG/MIN: 1 INJECTION, SOLUTION, CONCENTRATE INTRAVENOUS at 22:54

## 2022-01-01 RX ADMIN — EPINEPHRINE 7.5 MCG/MIN: 1 INJECTION INTRAMUSCULAR; INTRAVENOUS; SUBCUTANEOUS at 01:59

## 2022-01-01 RX ADMIN — SODIUM CHLORIDE, POTASSIUM CHLORIDE, SODIUM LACTATE AND CALCIUM CHLORIDE 500 ML: 600; 310; 30; 20 INJECTION, SOLUTION INTRAVENOUS at 16:47

## 2022-01-01 RX ADMIN — SODIUM CHLORIDE, SODIUM GLUCONATE, SODIUM ACETATE, POTASSIUM CHLORIDE AND MAGNESIUM CHLORIDE: 526; 502; 368; 37; 30 INJECTION, SOLUTION INTRAVENOUS at 18:01

## 2022-01-01 RX ADMIN — EPINEPHRINE 1 MG: 0.1 INJECTION, SOLUTION ENDOTRACHEAL; INTRACARDIAC; INTRAVENOUS at 21:15

## 2022-01-01 RX ADMIN — PHENYLEPHRINE HYDROCHLORIDE 300 MCG/MIN: 10 INJECTION INTRAVENOUS at 04:55

## 2022-01-01 RX ADMIN — PHENYLEPHRINE HYDROCHLORIDE 10 MCG/MIN: 10 INJECTION INTRAVENOUS at 01:27

## 2022-01-01 RX ADMIN — CEFEPIME 1000 MG: 1 INJECTION, POWDER, FOR SOLUTION INTRAMUSCULAR; INTRAVENOUS at 18:00

## 2022-01-01 ASSESSMENT — PAIN SCALES - GENERAL
PAINLEVEL_OUTOF10: 0
PAINLEVEL_OUTOF10: 0

## 2022-01-01 ASSESSMENT — PULMONARY FUNCTION TESTS
PIF_VALUE: 22
PIF_VALUE: 22
PIF_VALUE: 24
PIF_VALUE: 22
PIF_VALUE: 21
PIF_VALUE: 15
PIF_VALUE: 18
PIF_VALUE: 15
PIF_VALUE: 23
PIF_VALUE: 24
PIF_VALUE: 22
PIF_VALUE: 26
PIF_VALUE: 15
PIF_VALUE: 16
PIF_VALUE: 15
PIF_VALUE: 21
PIF_VALUE: 16
PIF_VALUE: 15
PIF_VALUE: 22
PIF_VALUE: 15
PIF_VALUE: 14
PIF_VALUE: 15
PIF_VALUE: 15
PIF_VALUE: 16
PIF_VALUE: 15
PIF_VALUE: 15
PIF_VALUE: 16
PIF_VALUE: 15
PIF_VALUE: 14
PIF_VALUE: 15
PIF_VALUE: 14
PIF_VALUE: 19
PIF_VALUE: 15
PIF_VALUE: 22
PIF_VALUE: 16
PIF_VALUE: 24
PIF_VALUE: 22
PIF_VALUE: 18

## 2022-06-29 ENCOUNTER — APPOINTMENT (OUTPATIENT)
Dept: CT IMAGING | Age: 42
End: 2022-06-29
Payer: COMMERCIAL

## 2022-06-29 ENCOUNTER — HOSPITAL ENCOUNTER (EMERGENCY)
Age: 42
Discharge: HOME | End: 2022-06-30
Attending: EMERGENCY MEDICINE
Payer: COMMERCIAL

## 2022-06-29 DIAGNOSIS — N20.0 NEPHROLITHIASIS: Primary | ICD-10-CM

## 2022-06-29 LAB
A/G RATIO: 1 (ref 1.1–2.2)
ALBUMIN SERPL-MCNC: 3.7 G/DL (ref 3.4–5)
ALP BLD-CCNC: 118 U/L (ref 40–129)
ALT SERPL-CCNC: 17 U/L (ref 10–40)
ANION GAP SERPL CALCULATED.3IONS-SCNC: 15 MMOL/L (ref 3–16)
AST SERPL-CCNC: 46 U/L (ref 15–37)
BACTERIA: ABNORMAL /HPF
BASOPHILS ABSOLUTE: 0 K/UL (ref 0–0.2)
BASOPHILS RELATIVE PERCENT: 0.4 %
BILIRUB SERPL-MCNC: 0.9 MG/DL (ref 0–1)
BILIRUBIN DIRECT: 0.4 MG/DL (ref 0–0.3)
BILIRUBIN URINE: NEGATIVE
BILIRUBIN, INDIRECT: 0.5 MG/DL (ref 0–1)
BLOOD, URINE: NEGATIVE
BUN BLDV-MCNC: 4 MG/DL (ref 7–20)
CALCIUM SERPL-MCNC: 9.1 MG/DL (ref 8.3–10.6)
CHLORIDE BLD-SCNC: 93 MMOL/L (ref 99–110)
CLARITY: CLEAR
CO2: 27 MMOL/L (ref 21–32)
COLOR: YELLOW
CREAT SERPL-MCNC: <0.5 MG/DL (ref 0.9–1.3)
CRYSTALS, UA: ABNORMAL /HPF
EOSINOPHILS ABSOLUTE: 0.2 K/UL (ref 0–0.6)
EOSINOPHILS RELATIVE PERCENT: 1.9 %
EPITHELIAL CELLS, UA: ABNORMAL /HPF (ref 0–5)
GFR AFRICAN AMERICAN: >60
GFR NON-AFRICAN AMERICAN: >60
GLUCOSE BLD-MCNC: 127 MG/DL (ref 70–99)
GLUCOSE URINE: NEGATIVE MG/DL
HCT VFR BLD CALC: 45.6 % (ref 40.5–52.5)
HEMATOLOGY PATH CONSULT: YES
HEMOGLOBIN: 15.6 G/DL (ref 13.5–17.5)
KETONES, URINE: NEGATIVE MG/DL
LEUKOCYTE ESTERASE, URINE: ABNORMAL
LIPASE: 25 U/L (ref 13–60)
LYMPHOCYTES ABSOLUTE: 1.3 K/UL (ref 1–5.1)
LYMPHOCYTES RELATIVE PERCENT: 14.7 %
MAGNESIUM: 1.5 MG/DL (ref 1.8–2.4)
MCH RBC QN AUTO: 39.4 PG (ref 26–34)
MCHC RBC AUTO-ENTMCNC: 34.2 G/DL (ref 31–36)
MCV RBC AUTO: 115.3 FL (ref 80–100)
MICROSCOPIC EXAMINATION: YES
MONOCYTES ABSOLUTE: 0.7 K/UL (ref 0–1.3)
MONOCYTES RELATIVE PERCENT: 8.6 %
MUCUS: ABNORMAL /LPF
NEUTROPHILS ABSOLUTE: 6.4 K/UL (ref 1.7–7.7)
NEUTROPHILS RELATIVE PERCENT: 74.4 %
NITRITE, URINE: NEGATIVE
PDW BLD-RTO: 16.9 % (ref 12.4–15.4)
PH UA: 6.5 (ref 5–8)
PLATELET # BLD: 207 K/UL (ref 135–450)
PMV BLD AUTO: 8.6 FL (ref 5–10.5)
POTASSIUM REFLEX MAGNESIUM: 3.5 MMOL/L (ref 3.5–5.1)
PROTEIN UA: NEGATIVE MG/DL
RBC # BLD: 3.96 M/UL (ref 4.2–5.9)
RBC UA: ABNORMAL /HPF (ref 0–4)
SLIDE REVIEW: ABNORMAL
SODIUM BLD-SCNC: 135 MMOL/L (ref 136–145)
SPECIFIC GRAVITY UA: 1.01 (ref 1–1.03)
TOTAL PROTEIN: 7.3 G/DL (ref 6.4–8.2)
URINE TYPE: ABNORMAL
UROBILINOGEN, URINE: >=8 E.U./DL
WBC # BLD: 8.6 K/UL (ref 4–11)
WBC UA: ABNORMAL /HPF (ref 0–5)

## 2022-06-29 PROCEDURE — 6360000002 HC RX W HCPCS: Performed by: STUDENT IN AN ORGANIZED HEALTH CARE EDUCATION/TRAINING PROGRAM

## 2022-06-29 PROCEDURE — 96375 TX/PRO/DX INJ NEW DRUG ADDON: CPT

## 2022-06-29 PROCEDURE — 36415 COLL VENOUS BLD VENIPUNCTURE: CPT

## 2022-06-29 PROCEDURE — 96374 THER/PROPH/DIAG INJ IV PUSH: CPT

## 2022-06-29 PROCEDURE — 81001 URINALYSIS AUTO W/SCOPE: CPT

## 2022-06-29 PROCEDURE — 74177 CT ABD & PELVIS W/CONTRAST: CPT

## 2022-06-29 PROCEDURE — 83690 ASSAY OF LIPASE: CPT

## 2022-06-29 PROCEDURE — 80053 COMPREHEN METABOLIC PANEL: CPT

## 2022-06-29 PROCEDURE — 6360000004 HC RX CONTRAST MEDICATION: Performed by: STUDENT IN AN ORGANIZED HEALTH CARE EDUCATION/TRAINING PROGRAM

## 2022-06-29 PROCEDURE — 85025 COMPLETE CBC W/AUTO DIFF WBC: CPT

## 2022-06-29 PROCEDURE — 99285 EMERGENCY DEPT VISIT HI MDM: CPT

## 2022-06-29 PROCEDURE — 83735 ASSAY OF MAGNESIUM: CPT

## 2022-06-29 PROCEDURE — 2580000003 HC RX 258: Performed by: STUDENT IN AN ORGANIZED HEALTH CARE EDUCATION/TRAINING PROGRAM

## 2022-06-29 PROCEDURE — 6370000000 HC RX 637 (ALT 250 FOR IP): Performed by: STUDENT IN AN ORGANIZED HEALTH CARE EDUCATION/TRAINING PROGRAM

## 2022-06-29 RX ORDER — TAMSULOSIN HYDROCHLORIDE 0.4 MG/1
0.4 CAPSULE ORAL ONCE
Status: COMPLETED | OUTPATIENT
Start: 2022-06-29 | End: 2022-06-29

## 2022-06-29 RX ORDER — KETOROLAC TROMETHAMINE 30 MG/ML
15 INJECTION, SOLUTION INTRAMUSCULAR; INTRAVENOUS ONCE
Status: COMPLETED | OUTPATIENT
Start: 2022-06-29 | End: 2022-06-29

## 2022-06-29 RX ORDER — MORPHINE SULFATE 4 MG/ML
4 INJECTION, SOLUTION INTRAMUSCULAR; INTRAVENOUS ONCE
Status: COMPLETED | OUTPATIENT
Start: 2022-06-29 | End: 2022-06-29

## 2022-06-29 RX ORDER — SODIUM CHLORIDE, SODIUM LACTATE, POTASSIUM CHLORIDE, AND CALCIUM CHLORIDE .6; .31; .03; .02 G/100ML; G/100ML; G/100ML; G/100ML
1000 INJECTION, SOLUTION INTRAVENOUS ONCE
Status: COMPLETED | OUTPATIENT
Start: 2022-06-29 | End: 2022-06-29

## 2022-06-29 RX ORDER — ONDANSETRON 4 MG/1
8 TABLET, ORALLY DISINTEGRATING ORAL ONCE
Status: COMPLETED | OUTPATIENT
Start: 2022-06-29 | End: 2022-06-29

## 2022-06-29 RX ADMIN — MORPHINE SULFATE 4 MG: 4 INJECTION INTRAVENOUS at 21:43

## 2022-06-29 RX ADMIN — KETOROLAC TROMETHAMINE 15 MG: 30 INJECTION, SOLUTION INTRAMUSCULAR at 23:41

## 2022-06-29 RX ADMIN — SODIUM CHLORIDE, POTASSIUM CHLORIDE, SODIUM LACTATE AND CALCIUM CHLORIDE 1000 ML: 600; 310; 30; 20 INJECTION, SOLUTION INTRAVENOUS at 21:41

## 2022-06-29 RX ADMIN — IOPAMIDOL 80 ML: 755 INJECTION, SOLUTION INTRAVENOUS at 22:36

## 2022-06-29 RX ADMIN — TAMSULOSIN HYDROCHLORIDE 0.4 MG: 0.4 CAPSULE ORAL at 23:40

## 2022-06-29 RX ADMIN — ONDANSETRON 8 MG: 4 TABLET, ORALLY DISINTEGRATING ORAL at 23:40

## 2022-06-29 ASSESSMENT — PAIN - FUNCTIONAL ASSESSMENT
PAIN_FUNCTIONAL_ASSESSMENT: ACTIVITIES ARE NOT PREVENTED
PAIN_FUNCTIONAL_ASSESSMENT: 0-10

## 2022-06-29 ASSESSMENT — PAIN SCALES - GENERAL
PAINLEVEL_OUTOF10: 9
PAINLEVEL_OUTOF10: 3
PAINLEVEL_OUTOF10: 7
PAINLEVEL_OUTOF10: 3

## 2022-06-29 ASSESSMENT — PAIN DESCRIPTION - ORIENTATION
ORIENTATION: MID
ORIENTATION: LEFT

## 2022-06-29 ASSESSMENT — PAIN DESCRIPTION - DESCRIPTORS
DESCRIPTORS: ACHING;DISCOMFORT
DESCRIPTORS: ACHING;DISCOMFORT
DESCRIPTORS: ACHING
DESCRIPTORS: ACHING;SHARP;SHOOTING;DISCOMFORT

## 2022-06-29 ASSESSMENT — PAIN DESCRIPTION - LOCATION
LOCATION: ABDOMEN
LOCATION: BACK

## 2022-06-29 ASSESSMENT — PAIN DESCRIPTION - PAIN TYPE
TYPE: ACUTE PAIN
TYPE: ACUTE PAIN

## 2022-06-30 VITALS
BODY MASS INDEX: 22.43 KG/M2 | TEMPERATURE: 99 F | WEIGHT: 170 LBS | SYSTOLIC BLOOD PRESSURE: 117 MMHG | HEART RATE: 97 BPM | DIASTOLIC BLOOD PRESSURE: 88 MMHG | OXYGEN SATURATION: 97 % | RESPIRATION RATE: 18 BRPM

## 2022-06-30 LAB — HEMATOLOGY PATH CONSULT: NORMAL

## 2022-06-30 PROCEDURE — U0005 INFEC AGEN DETEC AMPLI PROBE: HCPCS

## 2022-06-30 PROCEDURE — U0003 INFECTIOUS AGENT DETECTION BY NUCLEIC ACID (DNA OR RNA); SEVERE ACUTE RESPIRATORY SYNDROME CORONAVIRUS 2 (SARS-COV-2) (CORONAVIRUS DISEASE [COVID-19]), AMPLIFIED PROBE TECHNIQUE, MAKING USE OF HIGH THROUGHPUT TECHNOLOGIES AS DESCRIBED BY CMS-2020-01-R: HCPCS

## 2022-06-30 RX ORDER — TAMSULOSIN HYDROCHLORIDE 0.4 MG/1
0.4 CAPSULE ORAL DAILY
Qty: 7 CAPSULE | Refills: 0 | Status: SHIPPED | OUTPATIENT
Start: 2022-06-29 | End: 2022-07-06

## 2022-06-30 RX ORDER — ONDANSETRON 4 MG/1
4 TABLET, FILM COATED ORAL EVERY 8 HOURS PRN
Qty: 20 TABLET | Refills: 0 | Status: SHIPPED | OUTPATIENT
Start: 2022-06-30

## 2022-06-30 RX ORDER — OXYCODONE HYDROCHLORIDE 5 MG/1
5 TABLET ORAL EVERY 6 HOURS PRN
Qty: 12 TABLET | Refills: 0 | Status: SHIPPED | OUTPATIENT
Start: 2022-06-30 | End: 2022-07-02

## 2022-06-30 ASSESSMENT — ENCOUNTER SYMPTOMS
COLOR CHANGE: 0
VOMITING: 1
BACK PAIN: 0
NAUSEA: 1
DIARRHEA: 1
ABDOMINAL PAIN: 1
SHORTNESS OF BREATH: 0
SORE THROAT: 0
EYE REDNESS: 0
BLOOD IN STOOL: 0
RHINORRHEA: 0
CONSTIPATION: 0
COUGH: 0

## 2022-06-30 NOTE — ED PROVIDER NOTES
rash.   Neurological: Negative for weakness, light-headedness, numbness and headaches. Hematological: Does not bruise/bleed easily. Psychiatric/Behavioral: Negative for dysphoric mood. The patient is not nervous/anxious. Past Medical, Surgical, Family, and Social History     He has a past medical history of Bladder infection, Chronic kidney disease, and ETOH abuse. He has a past surgical history that includes Kidney surgery (kidney stones) and Cystocopy (10/04/2016). His family history is not on file. He reports that he has been smoking cigarettes. He has been smoking about 1.00 pack per day. He has never used smokeless tobacco. He reports current alcohol use. He reports previous drug use. Drug: Marijuana Sarah Berrios). Medications     Discharge Medication List as of 6/30/2022 12:37 AM      CONTINUE these medications which have NOT CHANGED    Details   ondansetron (ZOFRAN ODT) 4 MG disintegrating tablet Take 1 tablet by mouth every 8 hours as needed for Nausea or Vomiting, Disp-15 tablet, R-0Print             Allergies     He is allergic to ampicillin. Physical Exam     INITIAL VITALS: BP: 117/87, Temp: 99 °F (37.2 °C), Heart Rate: (!) 118, Resp: 18, SpO2: 98 %   Physical Exam  Vitals and nursing note reviewed. Constitutional:       General: He is not in acute distress. Appearance: Normal appearance. He is not toxic-appearing. HENT:      Head: Normocephalic and atraumatic. Right Ear: External ear normal.      Left Ear: External ear normal.      Nose: Nose normal. No congestion or rhinorrhea. Mouth/Throat:      Mouth: Mucous membranes are moist.      Pharynx: Oropharynx is clear. No oropharyngeal exudate or posterior oropharyngeal erythema. Eyes:      General: No scleral icterus. Right eye: No discharge. Left eye: No discharge. Extraocular Movements: Extraocular movements intact.       Conjunctiva/sclera: Conjunctivae normal.      Pupils: Pupils are equal, round, and reactive to light. Cardiovascular:      Rate and Rhythm: Regular rhythm. Tachycardia present. Pulses: Normal pulses. Heart sounds: Normal heart sounds. No murmur heard. No friction rub. No gallop. Pulmonary:      Effort: Pulmonary effort is normal. No respiratory distress. Breath sounds: Normal breath sounds. No wheezing, rhonchi or rales. Abdominal:      General: Abdomen is flat. There is no distension. Palpations: Abdomen is soft. Tenderness: There is abdominal tenderness (Bilateral lower quadrants and suprapubic area). There is left CVA tenderness. There is no right CVA tenderness, guarding or rebound. Musculoskeletal:         General: Normal range of motion. Cervical back: Normal range of motion and neck supple. Right lower leg: No edema. Left lower leg: No edema. Skin:     General: Skin is warm and dry. Neurological:      General: No focal deficit present. Mental Status: He is alert and oriented to person, place, and time. Psychiatric:         Mood and Affect: Mood normal.         Behavior: Behavior normal.         DiagnosticResults         RADIOLOGY:  CT ABDOMEN PELVIS W IV CONTRAST Additional Contrast? None   Final Result      1. 5 mm partially obstructing calculus in upper left ureter with mild left hydronephrosis. 2. Left kidney lower pole calyx staghorn calculus with adjacent cortical scar. 3. Large area of heterogenous enhancement in the right hepatic lobe with capsular retraction, nonspecific, may relate to confluent hepatic fibrosis. Additional tiny hypodense foci, too small to be adequately characterize. Recommended further evaluation    with contrast-enhanced MRI abdomen using liver protocol. Cele Nova           LABS:   Results for orders placed or performed during the hospital encounter of 06/29/22   Urinalysis   Result Value Ref Range    Color, UA Yellow Straw/Yellow    Clarity, UA Clear Clear    Glucose, Ur Negative Negative mg/dL Bilirubin Urine Negative Negative    Ketones, Urine Negative Negative mg/dL    Specific Gravity, UA 1.010 1.005 - 1.030    Blood, Urine Negative Negative    pH, UA 6.5 5.0 - 8.0    Protein, UA Negative Negative mg/dL    Urobilinogen, Urine >=8.0 (A) <2.0 E.U./dL    Nitrite, Urine Negative Negative    Leukocyte Esterase, Urine TRACE (A) Negative    Microscopic Examination YES     Urine Type NotGiven    Microscopic Urinalysis   Result Value Ref Range    Mucus, UA Rare (A) None Seen /LPF    WBC, UA None seen 0 - 5 /HPF    RBC, UA None seen 0 - 4 /HPF    Epithelial Cells, UA 0-1 0 - 5 /HPF    Bacteria, UA Rare (A) None Seen /HPF    Crystals, UA Few Ca.  Oxalate (A) None Seen /HPF   CBC with Auto Differential   Result Value Ref Range    WBC 8.6 4.0 - 11.0 K/uL    RBC 3.96 (L) 4.20 - 5.90 M/uL    Hemoglobin 15.6 13.5 - 17.5 g/dL    Hematocrit 45.6 40.5 - 52.5 %    .3 (H) 80.0 - 100.0 fL    MCH 39.4 (H) 26.0 - 34.0 pg    MCHC 34.2 31.0 - 36.0 g/dL    RDW 16.9 (H) 12.4 - 15.4 %    Platelets 429 321 - 092 K/uL    MPV 8.6 5.0 - 10.5 fL    SLIDE REVIEW see below     Path Consult Yes     Neutrophils % 74.4 %    Lymphocytes % 14.7 %    Monocytes % 8.6 %    Eosinophils % 1.9 %    Basophils % 0.4 %    Neutrophils Absolute 6.4 1.7 - 7.7 K/uL    Lymphocytes Absolute 1.3 1.0 - 5.1 K/uL    Monocytes Absolute 0.7 0.0 - 1.3 K/uL    Eosinophils Absolute 0.2 0.0 - 0.6 K/uL    Basophils Absolute 0.0 0.0 - 0.2 K/uL   Comprehensive Metabolic Panel w/ Reflex to MG   Result Value Ref Range    Sodium 135 (L) 136 - 145 mmol/L    Potassium reflex Magnesium 3.5 3.5 - 5.1 mmol/L    Chloride 93 (L) 99 - 110 mmol/L    CO2 27 21 - 32 mmol/L    Anion Gap 15 3 - 16    Glucose 127 (H) 70 - 99 mg/dL    BUN 4 (L) 7 - 20 mg/dL    CREATININE <0.5 (L) 0.9 - 1.3 mg/dL    GFR Non-African American >60 >60    GFR African American >60 >60    Calcium 9.1 8.3 - 10.6 mg/dL    Total Protein 7.3 6.4 - 8.2 g/dL    Albumin 3.7 3.4 - 5.0 g/dL    Albumin/Globulin Ratio 1.0 (L) 1.1 - 2.2    Total Bilirubin 0.9 0.0 - 1.0 mg/dL    Alkaline Phosphatase 118 40 - 129 U/L    ALT 17 10 - 40 U/L    AST 46 (H) 15 - 37 U/L   Hepatic Function Panel   Result Value Ref Range    Bilirubin, Direct 0.4 (H) 0.0 - 0.3 mg/dL    Bilirubin, Indirect 0.5 0.0 - 1.0 mg/dL   Lipase   Result Value Ref Range    Lipase 25.0 13.0 - 60.0 U/L   Magnesium   Result Value Ref Range    Magnesium 1.50 (L) 1.80 - 2.40 mg/dL       ED BEDSIDE ULTRASOUND:  No results found. RECENT VITALS:  BP: 117/88, Temp: 99 °F (37.2 °C), Heart Rate: 97,Resp: 18, SpO2: 97 %     Procedures     None    ED Course     Nursing Notes, Past Medical Hx, Past Surgical Hx, Social Hx, Allergies, and Family Hx were reviewed. The patient was given the followingmedications:  Orders Placed This Encounter   Medications    lactated ringers bolus    morphine injection 4 mg    iopamidol (ISOVUE-370) 76 % injection 80 mL    ketorolac (TORADOL) injection 15 mg    ondansetron (ZOFRAN-ODT) disintegrating tablet 8 mg    tamsulosin (FLOMAX) capsule 0.4 mg    tamsulosin (FLOMAX) 0.4 MG capsule     Sig: Take 1 capsule by mouth daily for 7 doses     Dispense:  7 capsule     Refill:  0    ondansetron (ZOFRAN) 4 MG tablet     Sig: Take 1 tablet by mouth every 8 hours as needed for Nausea     Dispense:  20 tablet     Refill:  0    oxyCODONE (ROXICODONE) 5 MG immediate release tablet     Sig: Take 1 tablet by mouth every 6 hours as needed for Pain for up to 12 doses. WARNING:  May cause drowsiness. Do not use in combination with alcohol. Dispense:  12 tablet     Refill:  0       CONSULTS:  None    MEDICAL DECISION MAKING / ASSESSMENT / Redge Alena is a 36 y.o. male with history of recurrent renal stones and cystitis who presents with abdominal pain, flank pain, nausea, vomiting, diarrhea. States symptoms are identical to previous episodes of renal stones.   Is tachycardic on initial evaluation and has a soft but tender abdomen with some left CVA tenderness. UA does not appear infected that was obtained in triage. Will get CBC, CMP, lipase,, CT abdomen pelvis with IV contrast.  Will evaluate for obstructing renal stone, appendicitis, diverticulitis. We have IV fluids, and morphine. CT abdomen pelvis with 5 mm obstructing stone left ureter with mild hydronephrosis and without significant stranding or signs of pyelonephritis. Patient given Toradol for pain, Zofran, and Flomax. CT abdomen pelvis also showed incidental liver findings with heterogeneous enhancement and small hypolucent foci. On reassessment, patient feels improved. Heart rate has normalized. Does state that he is a daily drinker. Discussed liver findings and need for PCP. Discussed trial of passage at home. Patient is amenable to this. We will give prescription for Zofran, Flomax, and short course of oxycodone. We will follow-up with resident clinic. We will also give follow-up with urology group. Patient is safe for discharge at this time. Return precautions given. Patient is discharged home in stable condition. This patient was also evaluated by the attending physician. All care plans werediscussed and agreed upon. Clinical Impression     1. Nephrolithiasis        Disposition     PATIENT REFERRED TO:  [address] [place] [address] [address]. [address] [place] [address].   [address]  [phone]  Call in 1 day      [facility] Physician Office  [address] [place], [place] [address]y  Schedule an appointment as soon as possible for a visit in 1 week        DISCHARGE MEDICATIONS:  Discharge Medication List as of 6/30/2022 12:37 AM      START taking these medications    Details   tamsulosin (FLOMAX) 0.4 MG capsule Take 1 capsule by mouth daily for 7 doses, Disp-7 capsule, R-0Print      ondansetron (ZOFRAN) 4 MG tablet Take 1 tablet by mouth every 8 hours as needed for Nausea, Disp-20 tablet, R-0Print oxyCODONE (ROXICODONE) 5 MG immediate release tablet Take 1 tablet by mouth every 6 hours as needed for Pain for up to 12 doses. WARNING:  May cause drowsiness.   Do not use in combination with alcohol., Disp-12 tablet, R-0Print             DISPOSITION Decision To Discharge 06/29/2022 11:47:31 PM     Sharmila Linton MD  Resident  06/30/22 [id number]

## 2022-06-30 NOTE — ED NOTES
Pt discharged from ED in stable condition. Discharge instruction explain, all question answered. Prescription given. Pt walked to [facility] independently.       Bonnie Ventura RN  06/30/22 0191

## 2022-06-30 NOTE — ED PROVIDER NOTES
ED Attending Attestation Note     Date of evaluation: 6/29/2022    This patient was seen by the resident. I have seen and examined the patient, agree with the workup, evaluation, management and diagnosis. The care plan has been discussed. My assessment reveals complaints of left flank pain. Patient reports history of kidney stones and urinary tract infections and states this feels similar to when he has had kidney stones in the past.  Patient has a soft abdomen on exam with bilateral lower quadrant tenderness present. Will check laboratory studies, imaging.      Page MD Jeremy  06/29/22 5685

## 2022-07-01 LAB — SARS-COV-2: NOT DETECTED

## 2022-10-15 ENCOUNTER — HOSPITAL ENCOUNTER (EMERGENCY)
Age: 42
Discharge: LEFT BEFORE DISCHARGE | End: 2022-10-15
Attending: EMERGENCY MEDICINE
Payer: COMMERCIAL

## 2022-10-15 ENCOUNTER — APPOINTMENT (OUTPATIENT)
Dept: CT IMAGING | Age: 42
End: 2022-10-15
Payer: COMMERCIAL

## 2022-10-15 VITALS
BODY MASS INDEX: 19.35 KG/M2 | DIASTOLIC BLOOD PRESSURE: 87 MMHG | HEART RATE: 126 BPM | WEIGHT: 146 LBS | SYSTOLIC BLOOD PRESSURE: 118 MMHG | TEMPERATURE: 98.1 F | OXYGEN SATURATION: 100 % | HEIGHT: 73 IN | RESPIRATION RATE: 23 BRPM

## 2022-10-15 DIAGNOSIS — R09.89 SUSPECTED DVT (DEEP VEIN THROMBOSIS): ICD-10-CM

## 2022-10-15 DIAGNOSIS — I26.99 OTHER ACUTE PULMONARY EMBOLISM WITHOUT ACUTE COR PULMONALE (HCC): Primary | ICD-10-CM

## 2022-10-15 LAB
ANION GAP SERPL CALCULATED.3IONS-SCNC: 11 MMOL/L (ref 3–16)
APTT: 29.3 SEC (ref 23–34.3)
BASOPHILS ABSOLUTE: 0.1 K/UL (ref 0–0.2)
BASOPHILS RELATIVE PERCENT: 1 %
BILIRUBIN URINE: NEGATIVE
BLOOD, URINE: NEGATIVE
BUN BLDV-MCNC: 3 MG/DL (ref 7–20)
CALCIUM SERPL-MCNC: 9 MG/DL (ref 8.3–10.6)
CHLORIDE BLD-SCNC: 99 MMOL/L (ref 99–110)
CLARITY: CLEAR
CO2: 27 MMOL/L (ref 21–32)
COLOR: YELLOW
CREAT SERPL-MCNC: <0.5 MG/DL (ref 0.9–1.3)
EOSINOPHILS ABSOLUTE: 0.3 K/UL (ref 0–0.6)
EOSINOPHILS RELATIVE PERCENT: 3.9 %
ETHANOL: 27 MG/DL (ref 0–0.08)
GFR AFRICAN AMERICAN: >60
GFR NON-AFRICAN AMERICAN: >60
GLUCOSE BLD-MCNC: 125 MG/DL (ref 70–99)
GLUCOSE URINE: NEGATIVE MG/DL
HCT VFR BLD CALC: 40.2 % (ref 40.5–52.5)
HEMOGLOBIN: 13.7 G/DL (ref 13.5–17.5)
INR BLD: 1 (ref 0.87–1.14)
KETONES, URINE: NEGATIVE MG/DL
LACTIC ACID: 2.6 MMOL/L (ref 0.4–2)
LEUKOCYTE ESTERASE, URINE: NEGATIVE
LYMPHOCYTES ABSOLUTE: 2.5 K/UL (ref 1–5.1)
LYMPHOCYTES RELATIVE PERCENT: 29.4 %
MAGNESIUM: 1.6 MG/DL (ref 1.8–2.4)
MCH RBC QN AUTO: 37.3 PG (ref 26–34)
MCHC RBC AUTO-ENTMCNC: 34.2 G/DL (ref 31–36)
MCV RBC AUTO: 109.1 FL (ref 80–100)
MICROSCOPIC EXAMINATION: NORMAL
MONOCYTES ABSOLUTE: 0.4 K/UL (ref 0–1.3)
MONOCYTES RELATIVE PERCENT: 4.6 %
NEUTROPHILS ABSOLUTE: 5.2 K/UL (ref 1.7–7.7)
NEUTROPHILS RELATIVE PERCENT: 61.1 %
NITRITE, URINE: NEGATIVE
PDW BLD-RTO: 16.2 % (ref 12.4–15.4)
PH UA: 7 (ref 5–8)
PLATELET # BLD: 391 K/UL (ref 135–450)
PMV BLD AUTO: 6.6 FL (ref 5–10.5)
POTASSIUM REFLEX MAGNESIUM: 3.5 MMOL/L (ref 3.5–5.1)
PROTEIN UA: NEGATIVE MG/DL
PROTHROMBIN TIME: 13.1 SEC (ref 11.7–14.5)
RBC # BLD: 3.68 M/UL (ref 4.2–5.9)
SODIUM BLD-SCNC: 137 MMOL/L (ref 136–145)
SPECIFIC GRAVITY UA: <=1.005 (ref 1–1.03)
TROPONIN: <0.01 NG/ML
URINE REFLEX TO CULTURE: NORMAL
URINE TYPE: NORMAL
UROBILINOGEN, URINE: 1 E.U./DL
WBC # BLD: 8.5 K/UL (ref 4–11)

## 2022-10-15 PROCEDURE — 81003 URINALYSIS AUTO W/O SCOPE: CPT

## 2022-10-15 PROCEDURE — 82077 ASSAY SPEC XCP UR&BREATH IA: CPT

## 2022-10-15 PROCEDURE — 85610 PROTHROMBIN TIME: CPT

## 2022-10-15 PROCEDURE — 99285 EMERGENCY DEPT VISIT HI MDM: CPT

## 2022-10-15 PROCEDURE — 2580000003 HC RX 258: Performed by: EMERGENCY MEDICINE

## 2022-10-15 PROCEDURE — 83735 ASSAY OF MAGNESIUM: CPT

## 2022-10-15 PROCEDURE — 93005 ELECTROCARDIOGRAM TRACING: CPT | Performed by: EMERGENCY MEDICINE

## 2022-10-15 PROCEDURE — 84484 ASSAY OF TROPONIN QUANT: CPT

## 2022-10-15 PROCEDURE — 71260 CT THORAX DX C+: CPT | Performed by: EMERGENCY MEDICINE

## 2022-10-15 PROCEDURE — 96361 HYDRATE IV INFUSION ADD-ON: CPT

## 2022-10-15 PROCEDURE — 6360000002 HC RX W HCPCS: Performed by: EMERGENCY MEDICINE

## 2022-10-15 PROCEDURE — 83605 ASSAY OF LACTIC ACID: CPT

## 2022-10-15 PROCEDURE — 96372 THER/PROPH/DIAG INJ SC/IM: CPT

## 2022-10-15 PROCEDURE — 6360000004 HC RX CONTRAST MEDICATION: Performed by: EMERGENCY MEDICINE

## 2022-10-15 PROCEDURE — 85730 THROMBOPLASTIN TIME PARTIAL: CPT

## 2022-10-15 PROCEDURE — 80048 BASIC METABOLIC PNL TOTAL CA: CPT

## 2022-10-15 PROCEDURE — 96365 THER/PROPH/DIAG IV INF INIT: CPT

## 2022-10-15 PROCEDURE — 85025 COMPLETE CBC W/AUTO DIFF WBC: CPT

## 2022-10-15 RX ORDER — 0.9 % SODIUM CHLORIDE 0.9 %
1000 INTRAVENOUS SOLUTION INTRAVENOUS ONCE
Status: COMPLETED | OUTPATIENT
Start: 2022-10-15 | End: 2022-10-15

## 2022-10-15 RX ORDER — HEPARIN SODIUM 1000 [USP'U]/ML
80 INJECTION, SOLUTION INTRAVENOUS; SUBCUTANEOUS ONCE
Status: DISCONTINUED | OUTPATIENT
Start: 2022-10-15 | End: 2022-10-15

## 2022-10-15 RX ORDER — HEPARIN SODIUM 10000 [USP'U]/100ML
5-30 INJECTION, SOLUTION INTRAVENOUS CONTINUOUS
Status: DISCONTINUED | OUTPATIENT
Start: 2022-10-15 | End: 2022-10-15

## 2022-10-15 RX ORDER — HEPARIN SODIUM 1000 [USP'U]/ML
40 INJECTION, SOLUTION INTRAVENOUS; SUBCUTANEOUS PRN
Status: DISCONTINUED | OUTPATIENT
Start: 2022-10-15 | End: 2022-10-15

## 2022-10-15 RX ORDER — ENOXAPARIN SODIUM 100 MG/ML
1 INJECTION SUBCUTANEOUS ONCE
Status: COMPLETED | OUTPATIENT
Start: 2022-10-15 | End: 2022-10-15

## 2022-10-15 RX ORDER — MAGNESIUM SULFATE 1 G/100ML
1000 INJECTION INTRAVENOUS ONCE
Status: COMPLETED | OUTPATIENT
Start: 2022-10-15 | End: 2022-10-15

## 2022-10-15 RX ORDER — HEPARIN SODIUM 1000 [USP'U]/ML
80 INJECTION, SOLUTION INTRAVENOUS; SUBCUTANEOUS PRN
Status: DISCONTINUED | OUTPATIENT
Start: 2022-10-15 | End: 2022-10-15

## 2022-10-15 RX ADMIN — ENOXAPARIN SODIUM 70 MG: 80 INJECTION SUBCUTANEOUS at 18:42

## 2022-10-15 RX ADMIN — IOPAMIDOL 75 ML: 755 INJECTION, SOLUTION INTRAVENOUS at 17:36

## 2022-10-15 RX ADMIN — SODIUM CHLORIDE 1000 ML: 9 INJECTION, SOLUTION INTRAVENOUS at 17:08

## 2022-10-15 RX ADMIN — SODIUM CHLORIDE 1000 ML: 9 INJECTION, SOLUTION INTRAVENOUS at 17:46

## 2022-10-15 RX ADMIN — MAGNESIUM SULFATE HEPTAHYDRATE 1000 MG: 1 INJECTION, SOLUTION INTRAVENOUS at 17:45

## 2022-10-15 ASSESSMENT — PAIN DESCRIPTION - FREQUENCY: FREQUENCY: CONTINUOUS

## 2022-10-15 ASSESSMENT — PAIN DESCRIPTION - DESCRIPTORS: DESCRIPTORS: ACHING

## 2022-10-15 ASSESSMENT — PAIN DESCRIPTION - PAIN TYPE: TYPE: ACUTE PAIN

## 2022-10-15 ASSESSMENT — PAIN DESCRIPTION - LOCATION: LOCATION: LEG

## 2022-10-15 ASSESSMENT — PAIN SCALES - GENERAL: PAINLEVEL_OUTOF10: 9

## 2022-10-15 ASSESSMENT — PAIN DESCRIPTION - ORIENTATION: ORIENTATION: LEFT

## 2022-10-15 ASSESSMENT — PAIN - FUNCTIONAL ASSESSMENT: PAIN_FUNCTIONAL_ASSESSMENT: 0-10

## 2022-10-15 NOTE — ED PROVIDER NOTES
[address]      Pt Name: Chevy Mendenhall  MRN: [id number]  [place] 1980  Date of evaluation: 10/15/2022  Provider: Meghana Brady MD    [address] [place] [address]       Chief Complaint   Patient presents with    Leg Pain     calf         HISTORY OF PRESENT ILLNESS   (Location/Symptom, Timing/Onset, Context/Setting, Quality, Duration, Modifying Factors, Severity)  Note limiting factors. Chevy Mendenhall is a 36 y.o. male with past medical history of alcohol abuse chronic kidney disease here today with left calf pain. The patient states that the past few days he has had pain in his left calf. States that initially started in the superficial vein of his left medial thigh but now is located in the left posterior calf. Throbbing aching discomfort. He feels like his left calf is slightly swollen but is noticed no redness. He was previously having some chest pain and shortness of breath earlier in the week that is since resolved. He notes he has been very weak and fatigued after having COVID 2 months ago. He states he basically lays in bed all day. Does continue to drink approximately 1/5 of liquor daily. No prior history of DVT/PE. No prior history of arrhythmia. Denies any trauma or injury to the left leg. States has been fairly immobile over the past 2 months due to fatigue after having COVID at that time    HPI    Nursing Notes were reviewed. REVIEW OF SYSTEMS    (2-9 systems for level 4, 10 or more for level 5)     Review of Systems    Please see HPI for pertinent positive and negative review of system findings. A full 10 system ROS was performed and otherwise negative.         PAST MEDICAL HISTORY     Past Medical History:   Diagnosis Date    Bladder infection     Chronic kidney disease     ETOH abuse          SURGICAL HISTORY       Past Surgical History:   Procedure Laterality Date    CYSTOSCOPY  10/04/2016    CYSTOSCOPY, DVIU, LEFT RETROGRADE AND STENT PLACMENT    KIDNEY SURGERY  kidney stones         CURRENT MEDICATIONS       Previous Medications    ONDANSETRON (ZOFRAN ODT) 4 MG DISINTEGRATING TABLET    Take 1 tablet by mouth every 8 hours as needed for Nausea or Vomiting    ONDANSETRON (ZOFRAN) 4 MG TABLET    Take 1 tablet by mouth every 8 hours as needed for Nausea    TAMSULOSIN (FLOMAX) 0.4 MG CAPSULE    Take 1 capsule by mouth daily for 7 doses       ALLERGIES     Ampicillin    FAMILY HISTORY     History reviewed. No pertinent family history. SOCIAL HISTORY       Social History     Socioeconomic History    Marital status:      Spouse name: None    Number of children: None    Years of education: None    Highest education level: None   Tobacco Use    Smoking status: Every Day     Packs/day: 1.00     Types: Cigarettes    Smokeless tobacco: Never   Substance and Sexual Activity    Alcohol use: Yes     Comment: ETOH Abuse - 5 shots every other day of whiskey     Drug use: Not Currently     Types: Marijuana [place])    Sexual activity: Yes     Partners: Female       SCREENINGS    Romance Coma Scale  Eye Opening: Spontaneous  Best Verbal Response: Oriented  Best Motor Response: Obeys commands  Romance Coma Scale Score: 15          PHYSICAL EXAM    (up to 7 for level 4, 8 or more for level 5)     ED Triage Vitals [10/15/22 1622]   BP Temp Temp Source Heart Rate Resp SpO2 Height Weight   125/88 98.1 °F (36.7 °C) Oral (!) 130 16 99 % 6' 1\" (1.854 m) 146 lb (66.2 kg)       Physical Exam    General appearance:  Cooperative. Chronically ill-appearing but in no acute distress. Skin:  Warm. Dry. Gynecomastia  Eye:  Extraocular movements intact. Ears, nose, mouth and throat:  Oral mucosa moist,  Neck:  Trachea midline. Heart: Tachycardic but regular  Perfusion:  intact 2+ bilateral dorsalis pedis pulses. Respiratory:  Respirations nonlabored. Lungs clear to auscultation bilaterally. Abdominal:   Non distended.   Nontender  Neurological: Alert and oriented x 3. Moves all extremities spontaneously  Musculoskeletal:   Normal ROM, no deformities. Peripheral muscle wasting. Palpable venous cord in the left posterior calf. No significant asymmetry in the calves. No erythema, warmth or edema in the lower extremities. Psychiatric:  Normal mood      DIAGNOSTIC RESULTS       Labs Reviewed   CBC WITH AUTO DIFFERENTIAL - Abnormal; Notable for the following components:       Result Value    RBC 3.68 (*)     Hematocrit 40.2 (*)     .1 (*)     MCH 37.3 (*)     RDW 16.2 (*)     All other components within normal limits   BASIC METABOLIC PANEL W/ REFLEX TO MG FOR LOW K - Abnormal; Notable for the following components:    Glucose 125 (*)     BUN 3 (*)     Creatinine <0.5 (*)     All other components within normal limits   LACTIC ACID - Abnormal; Notable for the following components:    Lactic Acid 2.6 (*)     All other components within normal limits   MAGNESIUM - Abnormal; Notable for the following components:    Magnesium 1.60 (*)     All other components within normal limits   PROTIME-INR   APTT   TROPONIN   ETHANOL   URINALYSIS WITH REFLEX TO CULTURE   ANTI-XA, UNFRACTIONATED HEPARIN   ANTI-XA, UNFRACTIONATED HEPARIN       Interpretation per the Radiologist below, if obtained/available at the time of this note:    CT CHEST PULMONARY EMBOLISM W CONTRAST   Final Result      1. Left lower lobe basilar segmental pulmonary emboli. No evidence of right heart strain. 2.  Small areas of rounded subpleural airspace consolidation in the bilateral lower lobes, nonspecific and could be related to pulmonary infarcts but an infectious/inflammatory process or atelectasis are also possible. 3.  Moderate emphysema. Stable 3 mm left lower lobe pulmonary nodule. The findings were communicated to Jose Luis Sales, the emergency department physician at 3993.           All other labs/imaging were within normal range or not returned as of this dictation. EMERGENCY DEPARTMENT COURSE and DIFFERENTIAL DIAGNOSIS/MDM:   Vitals:    Vitals:    10/15/22 1622 10/15/22 1707 10/15/22 1737 10/15/22 1757   BP: 125/88  122/77 137/88   Pulse: (!) 130 (!) 129 (!) 118 (!) 124   Resp: 16 18 13 16   Temp: 98.1 °F (36.7 °C)      TempSrc: Oral      SpO2: 99% 100% 100% 100%   Weight: 146 lb (66.2 kg)      Height: 6' 1\" (1.854 m)          EKG: Sinus tachycardia rate of 128 bpm.  No ST elevation or other arrhythmia. Patient presents emergency department today complaining of left calf pain. He has a very obviously palpable cord and presumed DVT on exam.  Noted to be quite tachycardic with a heart rate in the 130s and 140s. Blood pressure stable. No oxygen requirement. Currently denying chest pain or shortness of breath but states he had some earlier in the week. Given his clinical exam of what appears to be very obvious DVT and tachycardia I am obviously concerned for pulmonary embolism. CT scan laboratory studies were performed. Troponin and BNP normal but CT scan is confirmatory of pulmonary embolism with likely pulmonary infarcts. Plan initially to start heparin and transfer the patient to [facility]. however he has adamantly refused transport by ambulance. He is agreeable for admission and for anticoagulation but states he will go by ambulance. I made multiple attempts to address the patient's concerns. I tried to address any financial concerns or if he wanted to smoke a cigarette by offering a nicotine patch. Also asked if he was going to drink alcohol which he denied. I explained the gravity of his diagnosis and that further ambulation with what is very obviously palpable DVT on exam could lead to worsening clot burden and ultimately cardiac arrest.  He states he just wants his wife to take him over.   I then got his wife on the phone and explained the situation and potentially life-threatening consequences to her however she states she will drive him over at his request.  At this point I have exhausted all options and the patient is fully aware of his diagnosis and the potential life-threatening complication should he leave [address] but ultimately will be discharged. I did call the hospitalist at [email or web address]. and he is aware that he will be coming over but did not want to formally accept the patient and write orders, but instead wanted him to go to the emergency department which I feel is quite reasonable. I spoke to the emergency physician and explained this as well. He was given a solo dose of Lovenox here as I did feel it was important to initiate treatment, but obviously could not start a heparin drip if he was leaving [address] Liya Cra M.D., am the primary clinician of record. MDM      CONSULTS     IP CONSULT TO HOSPITALIST    Critical Care:     CRITICAL CARE TIME    I personally saw the patient and independently provided 30 minutes of non-concurrent critical care out of the total shared critical care time provided, excluding separately reportable procedures. There was a high probability of clinically significant/life threatening deterioration in the patient's condition which required my urgent intervention. This time was spent reviewing the patient chart, interpreting diagnostic/laboratory data, anticoagulating the patient with Lovenox for pulmonary embolism, speaking exhaustively to the patient, his wife about the risks of leaving [address] with a current life-threatening condition such as a pulmonary embolism, speaking with accepting and transferring physicians. REASSESSMENT          PROCEDURE     Unless otherwise noted below, none     Procedures      FINAL IMPRESSION      1. Other acute pulmonary embolism without acute cor pulmonale ([facility] [address]    2.  Suspected DVT (deep vein thrombosis)            DISPOSITION/PLAN   DISPOSITION [place] 10/15/2022 06:26:01 PM        PATIENT REFERRED TO:  No follow-up provider specified. DISCHARGE MEDICATIONS:  New Prescriptions    No medications on file     Controlled Substances Monitoring:     No flowsheet data found.     (Please note that portions of this note were completed with a voice recognition program.  Efforts were made to edit the dictations but occasionally words are mis-transcribed.)    Jose Luis Sales MD (electronically signed)  Attending Emergency Physician            Iggy Hernandez MD  10/15/22 0242

## 2022-10-15 NOTE — ED NOTES
Patient reports last etoh use was this am CIWA a 0 at this time.      Roger Reardon RN  10/15/22 2248

## 2022-10-15 NOTE — ED NOTES
Patient reports he has been fatigued/sedentary for approx 2 months.      Olivia Jaime RN  10/15/22 6449

## 2022-10-15 NOTE — ED NOTES
Patient accepting admission refusing transportation, signed out AMA and reports he will have his wife drive him to the ED, patient understands the risk and still refuses. Report called to Leatha Wadsworth charge nurse at [facility] ed in the event he does go to the ed.      Werner Davila RN  10/15/22 7252

## 2022-10-15 NOTE — ED TRIAGE NOTES
Patient to ed with complaints of left leg pain which started 1 1/2 weeks ago patient denies injury, ekg bedside.

## 2022-10-17 LAB
EKG ATRIAL RATE: 128 BPM
EKG DIAGNOSIS: NORMAL
EKG P AXIS: 71 DEGREES
EKG P-R INTERVAL: 150 MS
EKG Q-T INTERVAL: 302 MS
EKG QRS DURATION: 82 MS
EKG QTC CALCULATION (BAZETT): 440 MS
EKG R AXIS: 74 DEGREES
EKG T AXIS: 62 DEGREES
EKG VENTRICULAR RATE: 128 BPM

## 2022-10-17 PROCEDURE — 93010 ELECTROCARDIOGRAM REPORT: CPT | Performed by: INTERNAL MEDICINE

## 2022-12-08 ENCOUNTER — HOSPITAL ENCOUNTER (INPATIENT)
Age: 42
LOS: 1 days | Discharge: LEFT BEFORE DISCHARGE | DRG: 134 | End: 2022-12-10
Attending: EMERGENCY MEDICINE | Admitting: INTERNAL MEDICINE
Payer: COMMERCIAL

## 2022-12-08 ENCOUNTER — APPOINTMENT (OUTPATIENT)
Dept: GENERAL RADIOLOGY | Age: 42
DRG: 134 | End: 2022-12-08
Payer: COMMERCIAL

## 2022-12-08 DIAGNOSIS — D64.9 ANEMIA, UNSPECIFIED TYPE: ICD-10-CM

## 2022-12-08 DIAGNOSIS — K52.9 COLITIS: ICD-10-CM

## 2022-12-08 DIAGNOSIS — I26.94 MULTIPLE SUBSEGMENTAL PULMONARY EMBOLI WITHOUT ACUTE COR PULMONALE (HCC): Primary | ICD-10-CM

## 2022-12-08 LAB
ALBUMIN SERPL-MCNC: 3.4 G/DL (ref 3.4–5)
ALP BLD-CCNC: 255 U/L (ref 40–129)
ALT SERPL-CCNC: 21 U/L (ref 10–40)
ANION GAP SERPL CALCULATED.3IONS-SCNC: 29 MMOL/L (ref 3–16)
AST SERPL-CCNC: 116 U/L (ref 15–37)
BASOPHILS ABSOLUTE: 0 K/UL (ref 0–0.2)
BASOPHILS RELATIVE PERCENT: 0.3 %
BILIRUB SERPL-MCNC: 3.1 MG/DL (ref 0–1)
BILIRUBIN DIRECT: 1.8 MG/DL (ref 0–0.3)
BILIRUBIN, INDIRECT: 1.3 MG/DL (ref 0–1)
BUN BLDV-MCNC: 10 MG/DL (ref 7–20)
CALCIUM SERPL-MCNC: 8.7 MG/DL (ref 8.3–10.6)
CHLORIDE BLD-SCNC: 86 MMOL/L (ref 99–110)
CO2: 17 MMOL/L (ref 21–32)
CREAT SERPL-MCNC: <0.5 MG/DL (ref 0.9–1.3)
EOSINOPHILS ABSOLUTE: 0.1 K/UL (ref 0–0.6)
EOSINOPHILS RELATIVE PERCENT: 0.9 %
ETHANOL: 14 MG/DL (ref 0–0.08)
GFR SERPL CREATININE-BSD FRML MDRD: >60 ML/MIN/{1.73_M2}
GLUCOSE BLD-MCNC: 92 MG/DL (ref 70–99)
HCT VFR BLD CALC: 24.5 % (ref 40.5–52.5)
HEMOGLOBIN: 8.7 G/DL (ref 13.5–17.5)
LIPASE: 18 U/L (ref 13–60)
LYMPHOCYTES ABSOLUTE: 0.7 K/UL (ref 1–5.1)
LYMPHOCYTES RELATIVE PERCENT: 10.3 %
MAGNESIUM: 1.8 MG/DL (ref 1.8–2.4)
MCH RBC QN AUTO: 41.7 PG (ref 26–34)
MCHC RBC AUTO-ENTMCNC: 35.5 G/DL (ref 31–36)
MCV RBC AUTO: 117.2 FL (ref 80–100)
MONOCYTES ABSOLUTE: 0.2 K/UL (ref 0–1.3)
MONOCYTES RELATIVE PERCENT: 3.3 %
NEUTROPHILS ABSOLUTE: 6.1 K/UL (ref 1.7–7.7)
NEUTROPHILS RELATIVE PERCENT: 85.2 %
PDW BLD-RTO: 25.3 % (ref 12.4–15.4)
PHOSPHORUS: 2.6 MG/DL (ref 2.5–4.9)
PLATELET # BLD: 206 K/UL (ref 135–450)
PMV BLD AUTO: 8.5 FL (ref 5–10.5)
POTASSIUM SERPL-SCNC: 3.3 MMOL/L (ref 3.5–5.1)
PRO-BNP: 347 PG/ML (ref 0–124)
RBC # BLD: 2.09 M/UL (ref 4.2–5.9)
SODIUM BLD-SCNC: 132 MMOL/L (ref 136–145)
TOTAL PROTEIN: 7.2 G/DL (ref 6.4–8.2)
TROPONIN: <0.01 NG/ML
WBC # BLD: 7.2 K/UL (ref 4–11)

## 2022-12-08 PROCEDURE — 83690 ASSAY OF LIPASE: CPT

## 2022-12-08 PROCEDURE — 83540 ASSAY OF IRON: CPT

## 2022-12-08 PROCEDURE — 83550 IRON BINDING TEST: CPT

## 2022-12-08 PROCEDURE — 93005 ELECTROCARDIOGRAM TRACING: CPT | Performed by: EMERGENCY MEDICINE

## 2022-12-08 PROCEDURE — 83735 ASSAY OF MAGNESIUM: CPT

## 2022-12-08 PROCEDURE — 83010 ASSAY OF HAPTOGLOBIN QUANT: CPT

## 2022-12-08 PROCEDURE — 84484 ASSAY OF TROPONIN QUANT: CPT

## 2022-12-08 PROCEDURE — 99285 EMERGENCY DEPT VISIT HI MDM: CPT

## 2022-12-08 PROCEDURE — 82728 ASSAY OF FERRITIN: CPT

## 2022-12-08 PROCEDURE — 71046 X-RAY EXAM CHEST 2 VIEWS: CPT

## 2022-12-08 PROCEDURE — 82077 ASSAY SPEC XCP UR&BREATH IA: CPT

## 2022-12-08 PROCEDURE — 83880 ASSAY OF NATRIURETIC PEPTIDE: CPT

## 2022-12-08 PROCEDURE — HZ2ZZZZ DETOXIFICATION SERVICES FOR SUBSTANCE ABUSE TREATMENT: ICD-10-PCS | Performed by: INTERNAL MEDICINE

## 2022-12-08 PROCEDURE — 84100 ASSAY OF PHOSPHORUS: CPT

## 2022-12-08 PROCEDURE — 80076 HEPATIC FUNCTION PANEL: CPT

## 2022-12-08 PROCEDURE — 36415 COLL VENOUS BLD VENIPUNCTURE: CPT

## 2022-12-08 PROCEDURE — 83615 LACTATE (LD) (LDH) ENZYME: CPT

## 2022-12-08 PROCEDURE — 80048 BASIC METABOLIC PNL TOTAL CA: CPT

## 2022-12-08 PROCEDURE — 85025 COMPLETE CBC W/AUTO DIFF WBC: CPT

## 2022-12-08 RX ORDER — SODIUM CHLORIDE, SODIUM LACTATE, POTASSIUM CHLORIDE, AND CALCIUM CHLORIDE .6; .31; .03; .02 G/100ML; G/100ML; G/100ML; G/100ML
1000 INJECTION, SOLUTION INTRAVENOUS ONCE
Status: COMPLETED | OUTPATIENT
Start: 2022-12-09 | End: 2022-12-09

## 2022-12-08 RX ORDER — ONDANSETRON 2 MG/ML
4 INJECTION INTRAMUSCULAR; INTRAVENOUS ONCE
Status: COMPLETED | OUTPATIENT
Start: 2022-12-09 | End: 2022-12-09

## 2022-12-08 ASSESSMENT — PAIN DESCRIPTION - PAIN TYPE: TYPE: ACUTE PAIN

## 2022-12-08 ASSESSMENT — PAIN DESCRIPTION - DESCRIPTORS: DESCRIPTORS: ACHING

## 2022-12-08 ASSESSMENT — PAIN DESCRIPTION - LOCATION: LOCATION: CHEST

## 2022-12-08 ASSESSMENT — PAIN SCALES - GENERAL: PAINLEVEL_OUTOF10: 8

## 2022-12-08 ASSESSMENT — PAIN - FUNCTIONAL ASSESSMENT: PAIN_FUNCTIONAL_ASSESSMENT: 0-10

## 2022-12-09 ENCOUNTER — APPOINTMENT (OUTPATIENT)
Dept: CT IMAGING | Age: 42
DRG: 134 | End: 2022-12-09
Payer: COMMERCIAL

## 2022-12-09 ENCOUNTER — APPOINTMENT (OUTPATIENT)
Dept: INTERVENTIONAL RADIOLOGY/VASCULAR | Age: 42
DRG: 134 | End: 2022-12-09
Payer: COMMERCIAL

## 2022-12-09 PROBLEM — R07.9 CHEST PAIN: Status: ACTIVE | Noted: 2022-01-01

## 2022-12-09 LAB
A/G RATIO: 1 (ref 1.1–2.2)
ABO/RH: NORMAL
ALBUMIN SERPL-MCNC: 2.9 G/DL (ref 3.4–5)
ALBUMIN SERPL-MCNC: 3 G/DL (ref 3.4–5)
ALBUMIN SERPL-MCNC: 3.4 G/DL (ref 3.4–5)
ALP BLD-CCNC: 244 U/L (ref 40–129)
ALT SERPL-CCNC: 20 U/L (ref 10–40)
AMORPHOUS: ABNORMAL /HPF
ANION GAP SERPL CALCULATED.3IONS-SCNC: 18 MMOL/L (ref 3–16)
ANION GAP SERPL CALCULATED.3IONS-SCNC: 28 MMOL/L (ref 3–16)
ANION GAP SERPL CALCULATED.3IONS-SCNC: 29 MMOL/L (ref 3–16)
ANISOCYTOSIS: ABNORMAL
ANTIBODY SCREEN: NORMAL
APTT: 34.5 SEC (ref 23–34.3)
AST SERPL-CCNC: 107 U/L (ref 15–37)
BASE EXCESS VENOUS: <5 MMOL/L (ref -2–3)
BASOPHILS ABSOLUTE: 0 K/UL (ref 0–0.2)
BASOPHILS ABSOLUTE: 0 K/UL (ref 0–0.2)
BASOPHILS RELATIVE PERCENT: 0 %
BASOPHILS RELATIVE PERCENT: 0.2 %
BILIRUB SERPL-MCNC: 4.2 MG/DL (ref 0–1)
BILIRUB SERPL-MCNC: 5.3 MG/DL (ref 0–1)
BILIRUBIN DIRECT: 4.4 MG/DL (ref 0–0.3)
BILIRUBIN URINE: ABNORMAL
BILIRUBIN, INDIRECT: 0.9 MG/DL (ref 0–1)
BLOOD BANK DISPENSE STATUS: NORMAL
BLOOD BANK DISPENSE STATUS: NORMAL
BLOOD BANK PRODUCT CODE: NORMAL
BLOOD BANK PRODUCT CODE: NORMAL
BLOOD SMEAR REVIEW: NORMAL
BLOOD, URINE: NEGATIVE
BPU ID: NORMAL
BPU ID: NORMAL
BUN BLDV-MCNC: 7 MG/DL (ref 7–20)
BUN BLDV-MCNC: 9 MG/DL (ref 7–20)
BUN BLDV-MCNC: 9 MG/DL (ref 7–20)
CALCIUM SERPL-MCNC: 7.7 MG/DL (ref 8.3–10.6)
CALCIUM SERPL-MCNC: 8.1 MG/DL (ref 8.3–10.6)
CALCIUM SERPL-MCNC: 9 MG/DL (ref 8.3–10.6)
CARBOXYHEMOGLOBIN: 1.9 % (ref 0–1.5)
CHLORIDE BLD-SCNC: 89 MMOL/L (ref 99–110)
CHLORIDE BLD-SCNC: 90 MMOL/L (ref 99–110)
CHLORIDE BLD-SCNC: 95 MMOL/L (ref 99–110)
CHP ED QC CHECK: YES
CLARITY: CLEAR
CO2: 12 MMOL/L (ref 21–32)
CO2: 18 MMOL/L (ref 21–32)
CO2: 18 MMOL/L (ref 21–32)
COLOR: YELLOW
CREAT SERPL-MCNC: 0.6 MG/DL (ref 0.9–1.3)
CREAT SERPL-MCNC: 0.6 MG/DL (ref 0.9–1.3)
CREAT SERPL-MCNC: <0.5 MG/DL (ref 0.9–1.3)
DAT POLYSPECIFIC: NORMAL
DESCRIPTION BLOOD BANK: NORMAL
DESCRIPTION BLOOD BANK: NORMAL
EKG ATRIAL RATE: 119 BPM
EKG DIAGNOSIS: NORMAL
EKG P AXIS: 81 DEGREES
EKG P-R INTERVAL: 186 MS
EKG Q-T INTERVAL: 294 MS
EKG QRS DURATION: 82 MS
EKG QTC CALCULATION (BAZETT): 413 MS
EKG R AXIS: 86 DEGREES
EKG T AXIS: 63 DEGREES
EKG VENTRICULAR RATE: 119 BPM
EOSINOPHILS ABSOLUTE: 0 K/UL (ref 0–0.6)
EOSINOPHILS ABSOLUTE: 0 K/UL (ref 0–0.6)
EOSINOPHILS RELATIVE PERCENT: 0 %
EOSINOPHILS RELATIVE PERCENT: 0.4 %
EPITHELIAL CELLS, UA: ABNORMAL /HPF (ref 0–5)
FERRITIN: 7832 NG/ML (ref 30–400)
FIBRINOGEN: 149 MG/DL (ref 207–509)
FOLATE: <2 NG/ML (ref 4.78–24.2)
GFR SERPL CREATININE-BSD FRML MDRD: >60 ML/MIN/{1.73_M2}
GLUCOSE BLD-MCNC: 178 MG/DL (ref 70–99)
GLUCOSE BLD-MCNC: 179 MG/DL (ref 70–99)
GLUCOSE BLD-MCNC: 48 MG/DL (ref 70–99)
GLUCOSE BLD-MCNC: 66 MG/DL (ref 70–99)
GLUCOSE BLD-MCNC: 84 MG/DL (ref 70–99)
GLUCOSE BLD-MCNC: 91 MG/DL (ref 70–99)
GLUCOSE URINE: NEGATIVE MG/DL
HCO3 VENOUS: 10.3 MMOL/L (ref 24–28)
HCT VFR BLD CALC: 18.9 % (ref 40.5–52.5)
HCT VFR BLD CALC: 22.7 % (ref 40.5–52.5)
HCT VFR BLD CALC: 22.7 % (ref 40.5–52.5)
HEMOCCULT STL QL: NEGATIVE
HEMOGLOBIN, VEN, REDUCED: 76.3 %
HEMOGLOBIN: 6.4 G/DL (ref 13.5–17.5)
HEMOGLOBIN: 7.7 G/DL (ref 13.5–17.5)
HYALINE CASTS: ABNORMAL /LPF (ref 0–2)
HYPOCHROMIA: ABNORMAL
IMMATURE RETIC FRACT: 0.51 (ref 0.21–0.37)
INFLUENZA A: NOT DETECTED
INFLUENZA B: NOT DETECTED
INR BLD: 1.44 (ref 0.87–1.14)
KETONES, URINE: 40 MG/DL
LACTATE DEHYDROGENASE: 258 U/L (ref 100–190)
LACTIC ACID: 14.2 MMOL/L (ref 0.4–2)
LACTIC ACID: 14.7 MMOL/L (ref 0.4–2)
LACTIC ACID: 18.2 MMOL/L (ref 0.4–2)
LACTIC ACID: 6.9 MMOL/L (ref 0.4–2)
LEUKOCYTE ESTERASE, URINE: NEGATIVE
LYMPHOCYTES ABSOLUTE: 0.7 K/UL (ref 1–5.1)
LYMPHOCYTES ABSOLUTE: 1 K/UL (ref 1–5.1)
LYMPHOCYTES RELATIVE PERCENT: 23 %
LYMPHOCYTES RELATIVE PERCENT: 8 %
MAGNESIUM: 1.7 MG/DL (ref 1.8–2.4)
MCH RBC QN AUTO: 40.9 PG (ref 26–34)
MCH RBC QN AUTO: 41.5 PG (ref 26–34)
MCHC RBC AUTO-ENTMCNC: 34.2 G/DL (ref 31–36)
MCHC RBC AUTO-ENTMCNC: 34.2 G/DL (ref 31–36)
MCV RBC AUTO: 119.7 FL (ref 80–100)
MCV RBC AUTO: 121.6 FL (ref 80–100)
METHEMOGLOBIN VENOUS: 1.3 % (ref 0–1.5)
MICROSCOPIC EXAMINATION: YES
MONOCYTES ABSOLUTE: 0.1 K/UL (ref 0–1.3)
MONOCYTES ABSOLUTE: 0.3 K/UL (ref 0–1.3)
MONOCYTES RELATIVE PERCENT: 3 %
MONOCYTES RELATIVE PERCENT: 3.9 %
MUCUS: ABNORMAL /LPF
NEUTROPHILS ABSOLUTE: 3.3 K/UL (ref 1.7–7.7)
NEUTROPHILS ABSOLUTE: 7.5 K/UL (ref 1.7–7.7)
NEUTROPHILS RELATIVE PERCENT: 74 %
NEUTROPHILS RELATIVE PERCENT: 87.5 %
NITRITE, URINE: NEGATIVE
O2 SAT, VEN: 21 %
PCO2, VEN: 25.2 MMHG (ref 41–51)
PDW BLD-RTO: 24.9 % (ref 12.4–15.4)
PDW BLD-RTO: 25.8 % (ref 12.4–15.4)
PERFORMED ON: ABNORMAL
PERFORMED ON: NORMAL
PERFORMED ON: NORMAL
PH UA: 6.5 (ref 5–8)
PH VENOUS: 7.22 (ref 7.35–7.45)
PHOSPHORUS: 1.4 MG/DL (ref 2.5–4.9)
PHOSPHORUS: 2 MG/DL (ref 2.5–4.9)
PLATELET # BLD: 126 K/UL (ref 135–450)
PLATELET # BLD: 174 K/UL (ref 135–450)
PLATELET SLIDE REVIEW: ABNORMAL
PMV BLD AUTO: 8.2 FL (ref 5–10.5)
PMV BLD AUTO: 8.3 FL (ref 5–10.5)
PO2, VEN: <30 MMHG (ref 25–40)
POIKILOCYTES: ABNORMAL
POTASSIUM SERPL-SCNC: 3.5 MMOL/L (ref 3.5–5.1)
POTASSIUM SERPL-SCNC: 3.8 MMOL/L (ref 3.5–5.1)
POTASSIUM SERPL-SCNC: 3.9 MMOL/L (ref 3.5–5.1)
PROCALCITONIN: 0.62 NG/ML (ref 0–0.15)
PROTEIN UA: ABNORMAL MG/DL
PROTHROMBIN TIME: 17.5 SEC (ref 11.7–14.5)
RBC # BLD: 1.55 M/UL (ref 4.2–5.9)
RBC # BLD: 1.89 M/UL (ref 4.2–5.9)
RBC UA: ABNORMAL /HPF (ref 0–4)
RENAL EPITHELIAL, UA: ABNORMAL /HPF (ref 0–1)
RETICULOCYTE ABSOLUTE COUNT: 0.05 M/UL
RETICULOCYTE COUNT PCT: 2.44 % (ref 0.5–2.18)
SARS-COV-2 RNA, RT PCR: NOT DETECTED
SODIUM BLD-SCNC: 131 MMOL/L (ref 136–145)
SODIUM BLD-SCNC: 131 MMOL/L (ref 136–145)
SODIUM BLD-SCNC: 135 MMOL/L (ref 136–145)
SPECIFIC GRAVITY UA: 1.02 (ref 1–1.03)
TARGET CELLS: ABNORMAL
TCO2 CALC VENOUS: 11 MMOL/L
TOTAL PROTEIN: 6.9 G/DL (ref 6.4–8.2)
URINE REFLEX TO CULTURE: YES
URINE TYPE: ABNORMAL
UROBILINOGEN, URINE: 4 E.U./DL
VITAMIN B-12: 296 PG/ML (ref 211–911)
WBC # BLD: 4.5 K/UL (ref 4–11)
WBC # BLD: 8.6 K/UL (ref 4–11)
WBC UA: ABNORMAL /HPF (ref 0–5)

## 2022-12-09 PROCEDURE — 06H03DZ INSERTION OF INTRALUMINAL DEVICE INTO INFERIOR VENA CAVA, PERCUTANEOUS APPROACH: ICD-10-PCS | Performed by: RADIOLOGY

## 2022-12-09 PROCEDURE — 81001 URINALYSIS AUTO W/SCOPE: CPT

## 2022-12-09 PROCEDURE — 2580000003 HC RX 258: Performed by: STUDENT IN AN ORGANIZED HEALTH CARE EDUCATION/TRAINING PROGRAM

## 2022-12-09 PROCEDURE — 82607 VITAMIN B-12: CPT

## 2022-12-09 PROCEDURE — 82247 BILIRUBIN TOTAL: CPT

## 2022-12-09 PROCEDURE — 2580000003 HC RX 258

## 2022-12-09 PROCEDURE — 83735 ASSAY OF MAGNESIUM: CPT

## 2022-12-09 PROCEDURE — 84145 PROCALCITONIN (PCT): CPT

## 2022-12-09 PROCEDURE — 2500000003 HC RX 250 WO HCPCS

## 2022-12-09 PROCEDURE — 85730 THROMBOPLASTIN TIME PARTIAL: CPT

## 2022-12-09 PROCEDURE — 36415 COLL VENOUS BLD VENIPUNCTURE: CPT

## 2022-12-09 PROCEDURE — 6360000002 HC RX W HCPCS: Performed by: STUDENT IN AN ORGANIZED HEALTH CARE EDUCATION/TRAINING PROGRAM

## 2022-12-09 PROCEDURE — 87040 BLOOD CULTURE FOR BACTERIA: CPT

## 2022-12-09 PROCEDURE — 82248 BILIRUBIN DIRECT: CPT

## 2022-12-09 PROCEDURE — 37191 INS ENDOVAS VENA CAVA FILTR: CPT

## 2022-12-09 PROCEDURE — 85045 AUTOMATED RETICULOCYTE COUNT: CPT

## 2022-12-09 PROCEDURE — 6370000000 HC RX 637 (ALT 250 FOR IP)

## 2022-12-09 PROCEDURE — 2580000003 HC RX 258: Performed by: INTERNAL MEDICINE

## 2022-12-09 PROCEDURE — 85384 FIBRINOGEN ACTIVITY: CPT

## 2022-12-09 PROCEDURE — 1200000000 HC SEMI PRIVATE

## 2022-12-09 PROCEDURE — 6360000002 HC RX W HCPCS: Performed by: EMERGENCY MEDICINE

## 2022-12-09 PROCEDURE — 2580000003 HC RX 258: Performed by: EMERGENCY MEDICINE

## 2022-12-09 PROCEDURE — 87636 SARSCOV2 & INF A&B AMP PRB: CPT

## 2022-12-09 PROCEDURE — P9016 RBC LEUKOCYTES REDUCED: HCPCS

## 2022-12-09 PROCEDURE — 96361 HYDRATE IV INFUSION ADD-ON: CPT

## 2022-12-09 PROCEDURE — 2500000003 HC RX 250 WO HCPCS: Performed by: STUDENT IN AN ORGANIZED HEALTH CARE EDUCATION/TRAINING PROGRAM

## 2022-12-09 PROCEDURE — C9113 INJ PANTOPRAZOLE SODIUM, VIA: HCPCS | Performed by: STUDENT IN AN ORGANIZED HEALTH CARE EDUCATION/TRAINING PROGRAM

## 2022-12-09 PROCEDURE — 82746 ASSAY OF FOLIC ACID SERUM: CPT

## 2022-12-09 PROCEDURE — 96374 THER/PROPH/DIAG INJ IV PUSH: CPT

## 2022-12-09 PROCEDURE — 83010 ASSAY OF HAPTOGLOBIN QUANT: CPT

## 2022-12-09 PROCEDURE — 86901 BLOOD TYPING SEROLOGIC RH(D): CPT

## 2022-12-09 PROCEDURE — 85610 PROTHROMBIN TIME: CPT

## 2022-12-09 PROCEDURE — 86900 BLOOD TYPING SEROLOGIC ABO: CPT

## 2022-12-09 PROCEDURE — 74177 CT ABD & PELVIS W/CONTRAST: CPT

## 2022-12-09 PROCEDURE — 6360000002 HC RX W HCPCS

## 2022-12-09 PROCEDURE — 6370000000 HC RX 637 (ALT 250 FOR IP): Performed by: STUDENT IN AN ORGANIZED HEALTH CARE EDUCATION/TRAINING PROGRAM

## 2022-12-09 PROCEDURE — 85025 COMPLETE CBC W/AUTO DIFF WBC: CPT

## 2022-12-09 PROCEDURE — 87086 URINE CULTURE/COLONY COUNT: CPT

## 2022-12-09 PROCEDURE — 99152 MOD SED SAME PHYS/QHP 5/>YRS: CPT

## 2022-12-09 PROCEDURE — 36430 TRANSFUSION BLD/BLD COMPNT: CPT

## 2022-12-09 PROCEDURE — 6360000002 HC RX W HCPCS: Performed by: INTERNAL MEDICINE

## 2022-12-09 PROCEDURE — 86850 RBC ANTIBODY SCREEN: CPT

## 2022-12-09 PROCEDURE — 86923 COMPATIBILITY TEST ELECTRIC: CPT

## 2022-12-09 PROCEDURE — 6360000004 HC RX CONTRAST MEDICATION: Performed by: RADIOLOGY

## 2022-12-09 PROCEDURE — 80053 COMPREHEN METABOLIC PANEL: CPT

## 2022-12-09 PROCEDURE — 83605 ASSAY OF LACTIC ACID: CPT

## 2022-12-09 PROCEDURE — 71260 CT THORAX DX C+: CPT | Performed by: EMERGENCY MEDICINE

## 2022-12-09 PROCEDURE — 86880 COOMBS TEST DIRECT: CPT

## 2022-12-09 PROCEDURE — 6360000004 HC RX CONTRAST MEDICATION: Performed by: EMERGENCY MEDICINE

## 2022-12-09 PROCEDURE — 82803 BLOOD GASES ANY COMBINATION: CPT

## 2022-12-09 RX ORDER — CYANOCOBALAMIN 1000 UG/ML
1000 INJECTION, SOLUTION INTRAMUSCULAR; SUBCUTANEOUS ONCE
Status: COMPLETED | OUTPATIENT
Start: 2022-12-09 | End: 2022-12-09

## 2022-12-09 RX ORDER — SODIUM CHLORIDE 0.9 % (FLUSH) 0.9 %
5-40 SYRINGE (ML) INJECTION EVERY 12 HOURS SCHEDULED
Status: DISCONTINUED | OUTPATIENT
Start: 2022-12-09 | End: 2022-12-10 | Stop reason: HOSPADM

## 2022-12-09 RX ORDER — 0.9 % SODIUM CHLORIDE 0.9 %
500 INTRAVENOUS SOLUTION INTRAVENOUS ONCE
Status: COMPLETED | OUTPATIENT
Start: 2022-12-09 | End: 2022-12-09

## 2022-12-09 RX ORDER — ACETAMINOPHEN 325 MG/1
650 TABLET ORAL EVERY 6 HOURS PRN
Status: DISCONTINUED | OUTPATIENT
Start: 2022-12-09 | End: 2022-12-10 | Stop reason: HOSPADM

## 2022-12-09 RX ORDER — CHLORDIAZEPOXIDE HYDROCHLORIDE 25 MG/1
25 CAPSULE, GELATIN COATED ORAL EVERY 6 HOURS
Status: DISCONTINUED | OUTPATIENT
Start: 2022-12-09 | End: 2022-12-10 | Stop reason: HOSPADM

## 2022-12-09 RX ORDER — POTASSIUM CHLORIDE 7.45 MG/ML
10 INJECTION INTRAVENOUS
Status: DISCONTINUED | OUTPATIENT
Start: 2022-12-09 | End: 2022-12-09

## 2022-12-09 RX ORDER — ONDANSETRON 4 MG/1
4 TABLET, ORALLY DISINTEGRATING ORAL EVERY 8 HOURS PRN
Status: DISCONTINUED | OUTPATIENT
Start: 2022-12-09 | End: 2022-12-10 | Stop reason: HOSPADM

## 2022-12-09 RX ORDER — DEXTROSE AND SODIUM CHLORIDE 5; .9 G/100ML; G/100ML
INJECTION, SOLUTION INTRAVENOUS CONTINUOUS
Status: DISCONTINUED | OUTPATIENT
Start: 2022-12-09 | End: 2022-12-09

## 2022-12-09 RX ORDER — SODIUM CHLORIDE 9 MG/ML
INJECTION, SOLUTION INTRAVENOUS PRN
Status: DISCONTINUED | OUTPATIENT
Start: 2022-12-09 | End: 2022-12-10 | Stop reason: HOSPADM

## 2022-12-09 RX ORDER — ONDANSETRON 2 MG/ML
4 INJECTION INTRAMUSCULAR; INTRAVENOUS EVERY 6 HOURS PRN
Status: DISCONTINUED | OUTPATIENT
Start: 2022-12-09 | End: 2022-12-10 | Stop reason: HOSPADM

## 2022-12-09 RX ORDER — POTASSIUM CHLORIDE 20 MEQ/1
20 TABLET, EXTENDED RELEASE ORAL 2 TIMES DAILY WITH MEALS
Status: DISCONTINUED | OUTPATIENT
Start: 2022-12-09 | End: 2022-12-09

## 2022-12-09 RX ORDER — 0.9 % SODIUM CHLORIDE 0.9 %
1000 INTRAVENOUS SOLUTION INTRAVENOUS ONCE
Status: COMPLETED | OUTPATIENT
Start: 2022-12-09 | End: 2022-12-09

## 2022-12-09 RX ORDER — SODIUM CHLORIDE 0.9 % (FLUSH) 0.9 %
5-40 SYRINGE (ML) INJECTION PRN
Status: DISCONTINUED | OUTPATIENT
Start: 2022-12-09 | End: 2022-12-10 | Stop reason: HOSPADM

## 2022-12-09 RX ORDER — FOLIC ACID 1 MG/1
1 TABLET ORAL DAILY
Status: DISCONTINUED | OUTPATIENT
Start: 2022-12-09 | End: 2022-12-10

## 2022-12-09 RX ORDER — LORAZEPAM 2 MG/ML
4 INJECTION INTRAMUSCULAR
Status: DISCONTINUED | OUTPATIENT
Start: 2022-12-09 | End: 2022-12-10 | Stop reason: HOSPADM

## 2022-12-09 RX ORDER — POTASSIUM CHLORIDE 750 MG/1
10 TABLET, EXTENDED RELEASE ORAL 2 TIMES DAILY
Status: DISCONTINUED | OUTPATIENT
Start: 2022-12-09 | End: 2022-12-09

## 2022-12-09 RX ORDER — POTASSIUM CHLORIDE 750 MG/1
10 TABLET, EXTENDED RELEASE ORAL ONCE
Status: COMPLETED | OUTPATIENT
Start: 2022-12-09 | End: 2022-12-09

## 2022-12-09 RX ORDER — LORAZEPAM 2 MG/ML
1 INJECTION INTRAMUSCULAR
Status: DISCONTINUED | OUTPATIENT
Start: 2022-12-09 | End: 2022-12-10 | Stop reason: HOSPADM

## 2022-12-09 RX ORDER — SODIUM CHLORIDE 9 MG/ML
INJECTION, SOLUTION INTRAVENOUS CONTINUOUS
Status: DISCONTINUED | OUTPATIENT
Start: 2022-12-09 | End: 2022-12-09

## 2022-12-09 RX ORDER — ACETAMINOPHEN 650 MG/1
650 SUPPOSITORY RECTAL EVERY 6 HOURS PRN
Status: DISCONTINUED | OUTPATIENT
Start: 2022-12-09 | End: 2022-12-10 | Stop reason: HOSPADM

## 2022-12-09 RX ORDER — MAGNESIUM SULFATE IN WATER 40 MG/ML
2000 INJECTION, SOLUTION INTRAVENOUS ONCE
Status: COMPLETED | OUTPATIENT
Start: 2022-12-09 | End: 2022-12-09

## 2022-12-09 RX ORDER — LORAZEPAM 2 MG/ML
2 INJECTION INTRAMUSCULAR
Status: DISCONTINUED | OUTPATIENT
Start: 2022-12-09 | End: 2022-12-10 | Stop reason: HOSPADM

## 2022-12-09 RX ORDER — LORAZEPAM 2 MG/ML
3 INJECTION INTRAMUSCULAR
Status: DISCONTINUED | OUTPATIENT
Start: 2022-12-09 | End: 2022-12-10 | Stop reason: HOSPADM

## 2022-12-09 RX ORDER — POLYETHYLENE GLYCOL 3350 17 G/17G
17 POWDER, FOR SOLUTION ORAL DAILY PRN
Status: DISCONTINUED | OUTPATIENT
Start: 2022-12-09 | End: 2022-12-10

## 2022-12-09 RX ORDER — DEXTROSE MONOHYDRATE 100 MG/ML
INJECTION, SOLUTION INTRAVENOUS CONTINUOUS PRN
Status: DISCONTINUED | OUTPATIENT
Start: 2022-12-09 | End: 2022-12-10 | Stop reason: HOSPADM

## 2022-12-09 RX ORDER — LORAZEPAM 1 MG/1
4 TABLET ORAL
Status: DISCONTINUED | OUTPATIENT
Start: 2022-12-09 | End: 2022-12-10 | Stop reason: HOSPADM

## 2022-12-09 RX ORDER — GAUZE BANDAGE 2" X 2"
100 BANDAGE TOPICAL DAILY
Status: DISCONTINUED | OUTPATIENT
Start: 2022-12-09 | End: 2022-12-09

## 2022-12-09 RX ORDER — POTASSIUM CHLORIDE 20 MEQ/1
20 TABLET, EXTENDED RELEASE ORAL ONCE
Status: COMPLETED | OUTPATIENT
Start: 2022-12-09 | End: 2022-12-09

## 2022-12-09 RX ORDER — POTASSIUM CHLORIDE 20 MEQ/1
40 TABLET, EXTENDED RELEASE ORAL ONCE
Status: DISCONTINUED | OUTPATIENT
Start: 2022-12-09 | End: 2022-12-09

## 2022-12-09 RX ORDER — FOLIC ACID 1 MG/1
1 TABLET ORAL DAILY
Status: DISCONTINUED | OUTPATIENT
Start: 2022-12-10 | End: 2022-12-09

## 2022-12-09 RX ORDER — SODIUM CHLORIDE 9 MG/ML
INJECTION, SOLUTION INTRAVENOUS CONTINUOUS
Status: DISCONTINUED | OUTPATIENT
Start: 2022-12-09 | End: 2022-12-10 | Stop reason: HOSPADM

## 2022-12-09 RX ORDER — LORAZEPAM 1 MG/1
2 TABLET ORAL
Status: DISCONTINUED | OUTPATIENT
Start: 2022-12-09 | End: 2022-12-10 | Stop reason: HOSPADM

## 2022-12-09 RX ORDER — LORAZEPAM 1 MG/1
3 TABLET ORAL
Status: DISCONTINUED | OUTPATIENT
Start: 2022-12-09 | End: 2022-12-10 | Stop reason: HOSPADM

## 2022-12-09 RX ORDER — PANTOPRAZOLE SODIUM 40 MG/10ML
40 INJECTION, POWDER, LYOPHILIZED, FOR SOLUTION INTRAVENOUS 2 TIMES DAILY
Status: DISCONTINUED | OUTPATIENT
Start: 2022-12-09 | End: 2022-12-10 | Stop reason: HOSPADM

## 2022-12-09 RX ORDER — POTASSIUM CHLORIDE 7.45 MG/ML
10 INJECTION INTRAVENOUS
Status: COMPLETED | OUTPATIENT
Start: 2022-12-09 | End: 2022-12-09

## 2022-12-09 RX ORDER — LORAZEPAM 1 MG/1
1 TABLET ORAL
Status: DISCONTINUED | OUTPATIENT
Start: 2022-12-09 | End: 2022-12-10 | Stop reason: HOSPADM

## 2022-12-09 RX ADMIN — SODIUM CHLORIDE: 9 INJECTION, SOLUTION INTRAVENOUS at 16:43

## 2022-12-09 RX ADMIN — PANTOPRAZOLE SODIUM 40 MG: 40 INJECTION, POWDER, LYOPHILIZED, FOR SOLUTION INTRAVENOUS at 20:14

## 2022-12-09 RX ADMIN — THIAMINE HYDROCHLORIDE 250 MG: 100 INJECTION, SOLUTION INTRAMUSCULAR; INTRAVENOUS at 09:52

## 2022-12-09 RX ADMIN — LORAZEPAM 2 MG: 1 TABLET ORAL at 14:49

## 2022-12-09 RX ADMIN — LORAZEPAM 1 MG: 1 TABLET ORAL at 07:33

## 2022-12-09 RX ADMIN — ONDANSETRON 4 MG: 2 INJECTION INTRAMUSCULAR; INTRAVENOUS at 00:24

## 2022-12-09 RX ADMIN — HYDROMORPHONE HYDROCHLORIDE 0.25 MG: 1 INJECTION, SOLUTION INTRAMUSCULAR; INTRAVENOUS; SUBCUTANEOUS at 11:24

## 2022-12-09 RX ADMIN — POTASSIUM CHLORIDE 10 MEQ: 10 INJECTION, SOLUTION INTRAVENOUS at 05:56

## 2022-12-09 RX ADMIN — SODIUM CHLORIDE 25 ML: 9 INJECTION, SOLUTION INTRAVENOUS at 05:52

## 2022-12-09 RX ADMIN — SODIUM CHLORIDE 500 ML: 9 INJECTION, SOLUTION INTRAVENOUS at 15:00

## 2022-12-09 RX ADMIN — DEXTROSE AND SODIUM CHLORIDE: 5; 900 INJECTION, SOLUTION INTRAVENOUS at 09:59

## 2022-12-09 RX ADMIN — SODIUM CHLORIDE, PRESERVATIVE FREE 10 ML: 5 INJECTION INTRAVENOUS at 09:53

## 2022-12-09 RX ADMIN — SODIUM CHLORIDE, PRESERVATIVE FREE 10 ML: 5 INJECTION INTRAVENOUS at 20:14

## 2022-12-09 RX ADMIN — LORAZEPAM 1 MG: 1 TABLET ORAL at 06:07

## 2022-12-09 RX ADMIN — CHLORDIAZEPOXIDE HYDROCHLORIDE 25 MG: 25 CAPSULE ORAL at 20:14

## 2022-12-09 RX ADMIN — FOLIC ACID 1 MG: 1 TABLET ORAL at 14:52

## 2022-12-09 RX ADMIN — POTASSIUM CHLORIDE 10 MEQ: 750 TABLET, EXTENDED RELEASE ORAL at 16:53

## 2022-12-09 RX ADMIN — HYDROMORPHONE HYDROCHLORIDE 0.5 MG: 1 INJECTION, SOLUTION INTRAMUSCULAR; INTRAVENOUS; SUBCUTANEOUS at 20:08

## 2022-12-09 RX ADMIN — POTASSIUM CHLORIDE 20 MEQ: 1500 TABLET, EXTENDED RELEASE ORAL at 16:52

## 2022-12-09 RX ADMIN — SODIUM CHLORIDE: 9 INJECTION, SOLUTION INTRAVENOUS at 05:49

## 2022-12-09 RX ADMIN — PANTOPRAZOLE SODIUM 40 MG: 40 INJECTION, POWDER, LYOPHILIZED, FOR SOLUTION INTRAVENOUS at 09:52

## 2022-12-09 RX ADMIN — SODIUM BICARBONATE: 84 INJECTION, SOLUTION INTRAVENOUS at 16:52

## 2022-12-09 RX ADMIN — CHLORDIAZEPOXIDE HYDROCHLORIDE 25 MG: 25 CAPSULE ORAL at 16:53

## 2022-12-09 RX ADMIN — SODIUM CHLORIDE, POTASSIUM CHLORIDE, SODIUM LACTATE AND CALCIUM CHLORIDE 1000 ML: 600; 310; 30; 20 INJECTION, SOLUTION INTRAVENOUS at 00:22

## 2022-12-09 RX ADMIN — IOPAMIDOL 80 ML: 755 INJECTION, SOLUTION INTRAVENOUS at 00:11

## 2022-12-09 RX ADMIN — SODIUM CHLORIDE: 9 INJECTION, SOLUTION INTRAVENOUS at 11:34

## 2022-12-09 RX ADMIN — SODIUM CHLORIDE 1000 ML: 9 INJECTION, SOLUTION INTRAVENOUS at 07:36

## 2022-12-09 RX ADMIN — SODIUM CHLORIDE, PRESERVATIVE FREE 10 ML: 5 INJECTION INTRAVENOUS at 09:54

## 2022-12-09 RX ADMIN — SODIUM CHLORIDE, PRESERVATIVE FREE 10 ML: 5 INJECTION INTRAVENOUS at 20:21

## 2022-12-09 RX ADMIN — POTASSIUM PHOSPHATE, MONOBASIC POTASSIUM PHOSPHATE, DIBASIC 15 MMOL: 224; 236 INJECTION, SOLUTION, CONCENTRATE INTRAVENOUS at 16:56

## 2022-12-09 RX ADMIN — POTASSIUM CHLORIDE 10 MEQ: 10 INJECTION, SOLUTION INTRAVENOUS at 07:35

## 2022-12-09 RX ADMIN — DEXTROSE MONOHYDRATE 125 ML: 100 INJECTION, SOLUTION INTRAVENOUS at 06:51

## 2022-12-09 RX ADMIN — CYANOCOBALAMIN 1000 MCG: 1000 INJECTION, SOLUTION INTRAMUSCULAR at 14:49

## 2022-12-09 RX ADMIN — MAGNESIUM SULFATE HEPTAHYDRATE 2000 MG: 40 INJECTION, SOLUTION INTRAVENOUS at 11:34

## 2022-12-09 RX ADMIN — IOHEXOL 25 ML: 350 INJECTION, SOLUTION INTRAVENOUS at 16:01

## 2022-12-09 ASSESSMENT — ENCOUNTER SYMPTOMS
APNEA: 0
ABDOMINAL PAIN: 1
EYES NEGATIVE: 1
ABDOMINAL DISTENTION: 0
SHORTNESS OF BREATH: 1
COUGH: 0
DIARRHEA: 0
COLOR CHANGE: 0
CONSTIPATION: 0
VOMITING: 1
NAUSEA: 1

## 2022-12-09 ASSESSMENT — PAIN DESCRIPTION - ONSET
ONSET: ON-GOING
ONSET: ON-GOING

## 2022-12-09 ASSESSMENT — PAIN SCALES - GENERAL
PAINLEVEL_OUTOF10: 7
PAINLEVEL_OUTOF10: 5
PAINLEVEL_OUTOF10: 5

## 2022-12-09 ASSESSMENT — PAIN - FUNCTIONAL ASSESSMENT
PAIN_FUNCTIONAL_ASSESSMENT: PREVENTS OR INTERFERES SOME ACTIVE ACTIVITIES AND ADLS
PAIN_FUNCTIONAL_ASSESSMENT: PREVENTS OR INTERFERES SOME ACTIVE ACTIVITIES AND ADLS

## 2022-12-09 ASSESSMENT — PAIN DESCRIPTION - LOCATION
LOCATION: BACK
LOCATION: BACK
LOCATION: ARM

## 2022-12-09 ASSESSMENT — PAIN DESCRIPTION - PAIN TYPE
TYPE: ACUTE PAIN
TYPE: ACUTE PAIN

## 2022-12-09 ASSESSMENT — PAIN DESCRIPTION - DESCRIPTORS
DESCRIPTORS: ACHING
DESCRIPTORS: ACHING

## 2022-12-09 ASSESSMENT — PAIN DESCRIPTION - ORIENTATION
ORIENTATION: RIGHT;LOWER
ORIENTATION: RIGHT;LOWER

## 2022-12-09 ASSESSMENT — PAIN DESCRIPTION - FREQUENCY
FREQUENCY: INTERMITTENT
FREQUENCY: INTERMITTENT

## 2022-12-09 NOTE — ED PROVIDER NOTES
Negative. Neurological: Negative. All other systems reviewed and are negative. Past Medical, Surgical, Family, and Social History     He has a past medical history of Bladder infection, Chronic kidney disease, and ETOH abuse. He has a past surgical history that includes Kidney surgery (kidney stones) and Cystocopy (10/04/2016). His family history is not on file. He reports that he has been smoking cigarettes. He has been smoking an average of 1 pack per day. He has never used smokeless tobacco. He reports current alcohol use. He reports that he does not currently use drugs after having used the following drugs: Marijuana [place]aine[place] Hotter). Medications     Previous Medications    ONDANSETRON (ZOFRAN ODT) 4 MG DISINTEGRATING TABLET    Take 1 tablet by mouth every 8 hours as needed for Nausea or Vomiting    ONDANSETRON (ZOFRAN) 4 MG TABLET    Take 1 tablet by mouth every 8 hours as needed for Nausea    TAMSULOSIN (FLOMAX) 0.4 MG CAPSULE    Take 1 capsule by mouth daily for 7 doses       Allergies     He is allergic to ampicillin. Physical Exam     INITIAL VITALS: BP: (!) 127/90, Temp: 98.2 °F (36.8 °C), Heart Rate: (!) 148, Resp: 18, SpO2: 100 %   Physical Exam  Vitals and nursing note reviewed. Constitutional:       General: He is not in acute distress. Comments: Chronically ill-appearing. HENT:      Mouth/Throat:      Mouth: Mucous membranes are dry. Pharynx: No oropharyngeal exudate. Eyes:      General: Scleral icterus present. Extraocular Movements: Extraocular movements intact. Pupils: Pupils are equal, round, and reactive to light. Cardiovascular:      Rate and Rhythm: Regular rhythm. Tachycardia present. Heart sounds: Normal heart sounds. Pulmonary:      Effort: Pulmonary effort is normal.      Breath sounds: No wheezing, rhonchi or rales. Abdominal:      General: Bowel sounds are normal.      Palpations: Abdomen is soft. Tenderness:  There is no abdominal tenderness. There is no guarding or rebound. Genitourinary:     Rectum: Guaiac result negative. Comments: Patient has light brown stool that is guaiac negative. Musculoskeletal:         General: No swelling. Normal range of motion. Cervical back: Normal range of motion and neck supple. Skin:     General: Skin is warm and dry. Neurological:      General: No focal deficit present. Mental Status: He is alert and oriented to person, place, and time. Cranial Nerves: No cranial nerve deficit. Motor: No weakness. Coordination: Coordination normal.       Diagnostic Results     EKG   EKG as interpreted by me shows patient to be in a sinus tachycardic rhythm with a rate of 118, normal axis, normal ID and QT intervals, normal QRS duration, no ST segment abnormalities, diffuse T wave flattening present. RADIOLOGY:  CT ABDOMEN PELVIS W IV CONTRAST Additional Contrast? None   Final Result   Impression:   1. Incomplete diffuse hepatic steatosis. 2. Left renal pelvis calculus. 3. Focal area of nodular urothelial thickening of the proximal left ureter just distal to the calculus of the left renal pelvis. This is favored to represent inflammatory lesion but neoplasm is not excluded. Recommend correlation and consider direct    visualization. 4. Mild left hydronephrosis. 5. Lower pole left renal calculi. 6. Wall thickening and submucosal fatty infiltration of the right colon and proximal transverse colon with paracolic stranding consistent with colitis. 7. Mild free fluid pelvis. 8. Left femoral, external iliac, and common iliac vein thrombi. CT CHEST PULMONARY EMBOLISM W CONTRAST   Final Result   Impression:   1. Small peripheral right lower lobe pulmonary embolism new from 10/15/2022, otherwise age-indeterminate. 2. Stable left lower lobe segmental pulmonary embolism. 3. 2.5 x 1.8 cm right lower lobe nodular opacity.  Differential could include pulmonary infarct, neoplasm, atelectasis, or infectious/inflammatory lesion. Recommend close follow-up. 4. Centrilobular emphysema. XR CHEST (2 VW)   Final Result   Impression:   No airspace disease.           LABS:   Results for orders placed or performed during the hospital encounter of 12/08/22   CBC with Auto Differential   Result Value Ref Range    WBC 7.2 4.0 - 11.0 K/uL    RBC 2.09 (L) 4.20 - 5.90 M/uL    Hemoglobin 8.7 (L) 13.5 - 17.5 g/dL    Hematocrit 24.5 (L) 40.5 - 52.5 %    .2 (H) 80.0 - 100.0 fL    MCH 41.7 (H) 26.0 - 34.0 pg    MCHC 35.5 31.0 - 36.0 g/dL    RDW 25.3 (H) 12.4 - 15.4 %    Platelets 707 591 - 439 K/uL    MPV 8.5 5.0 - 10.5 fL    Neutrophils % 85.2 %    Lymphocytes % 10.3 %    Monocytes % 3.3 %    Eosinophils % 0.9 %    Basophils % 0.3 %    Neutrophils Absolute 6.1 1.7 - 7.7 K/uL    Lymphocytes Absolute 0.7 (L) 1.0 - 5.1 K/uL    Monocytes Absolute 0.2 0.0 - 1.3 K/uL    Eosinophils Absolute 0.1 0.0 - 0.6 K/uL    Basophils Absolute 0.0 0.0 - 0.2 K/uL   Basic Metabolic Panel   Result Value Ref Range    Sodium 132 (L) 136 - 145 mmol/L    Potassium 3.3 (L) 3.5 - 5.1 mmol/L    Chloride 86 (L) 99 - 110 mmol/L    CO2 17 (L) 21 - 32 mmol/L    Anion Gap 29 (H) 3 - 16    Glucose 92 70 - 99 mg/dL    BUN 10 7 - 20 mg/dL    Creatinine <0.5 (L) 0.9 - 1.3 mg/dL    Est, Glom Filt Rate >60 >60    Calcium 8.7 8.3 - 10.6 mg/dL   Troponin   Result Value Ref Range    Troponin <0.01 <0.01 ng/mL   Brain Natriuretic Peptide   Result Value Ref Range    Pro- (H) 0 - 124 pg/mL   Magnesium   Result Value Ref Range    Magnesium 1.80 1.80 - 2.40 mg/dL   Phosphorus   Result Value Ref Range    Phosphorus 2.6 2.5 - 4.9 mg/dL   Ethanol   Result Value Ref Range    Ethanol Lvl 14 mg/dL   Hepatic Function Panel   Result Value Ref Range    Total Protein 7.2 6.4 - 8.2 g/dL    Albumin 3.4 3.4 - 5.0 g/dL    Alkaline Phosphatase 255 (H) 40 - 129 U/L    ALT 21 10 - 40 U/L     (H) 15 - 37 U/L    Total Bilirubin 3.1 (H) 0.0 - 1.0 mg/dL    Bilirubin, Direct 1.8 (H) 0.0 - 0.3 mg/dL    Bilirubin, Indirect 1.3 (H) 0.0 - 1.0 mg/dL   Lipase   Result Value Ref Range    Lipase 18.0 13.0 - 60.0 U/L   POCT BLOOD OCCULT   Result Value Ref Range    Occult Blood Fecal negative     QC OK? yes        ED BEDSIDE ULTRASOUND:  No results found. RECENT VITALS:  BP: 113/77,Temp: 98.2 °F (36.8 °C), Heart Rate: (!) 107, Resp: 14, SpO2: 100 %     Procedures     N/A    ED Course     Nursing Notes, Past Medical Hx, Past Surgical Hx, Social Hx,Allergies, and Family Hx were reviewed. patient was given the following medications:  Orders Placed This Encounter   Medications    lactated ringers bolus    ondansetron (ZOFRAN) injection 4 mg    iopamidol (ISOVUE-370) 76 % injection 80 mL       CONSULTS:  [facility] DECISIONMAKING / ASSESSMENT / Maryam Valery is a 36 y.o. male who presents to the emerge from a complaining of chest pain, shortness of breath, nausea, and vomiting. Patient was diagnosed with pulmonary embolism approximately 6 weeks ago but signed out [address] and is not currently on anticoagulation. He states that he started having right-sided chest pain several days ago and has noted shortness of breath, especially with exertion, over the last several days. States 2 days ago he started belting nausea and vomiting. He describes vomiting as nonbloody and nonbilious. He denies any diarrhea. On arrival, patient is tachycardic into the 130s but otherwise hemodynamically stable. He has clear breath sounds normal heart sounds. He has a soft abdomen with mild upper abdominal tenderness. Laboratory studies are significant for hemoglobin of 8.7 which is decreased from 13.7 on his prior ED visit 6 weeks ago. Renal panel shows a anion gap of 29 with bicarb of 17 but normal creatinine, likely alcoholic ketosis due to the patient's known history of alcohol abuse.   LFTs are minimally elevated and bilirubins are elevated. EKG shows no acute ischemic abnormalities. Troponin is negative. Patient had a CTPA performed that shows evidence of stable pulmonary embolus in the left but new possible subacute pulmonary embolism on the right. This corresponds with the patient's new pain. CT scan of the abdomen pelvis does show hepatic steatosis as well as some inflammation of the urothelium of the proximal ureter and findings of possible colitis. Patient has no diarrhea or other signs of colitis so hold on antibiotic biotics at this time. Stool guaiac was obtained which was negative. It is unclear as to why the patient had the significant drop in his hemoglobin so at this point I do not feel comfortable starting him on anticoagulation. Patient will be admitted to the hospitalist service for further work-up of his anemia as well as treatment of his pulmonary embolism. Critical Care:  Due to the immediate potential for life-threatening deterioration due to pulmonary embolism and anemia, I spent 35 minutes providing critical care. This time excludes time spent performing procedures but includes time spent on direct patient care, history retrieval, review of the chart, and discussions with patient, family, and consultant(s). Clinical Impression     1. Multiple subsegmental pulmonary emboli without acute cor pulmonale (HCC)    2. Anemia, unspecified type    3. Colitis        Disposition     PATIENT REFERRED TO:  No follow-up provider specified.     DISCHARGE MEDICATIONS:  New Prescriptions    No medications on file       DISPOSITION Decision To Admit 12/09/2022 01:29:51 AM         Louis Hyatt MD  12/09/22 4460

## 2022-12-09 NOTE — CONSULTS
Comprehensive Nutrition Assessment    RECOMMENDATIONS:  PO Diet: continue NPO   ONS: rec'd ordering Ensure Clear once diet advances  Nutrition Education: Education not indicated     NUTRITION ASSESSMENT:   Nutritional summary & status: Consult for poor appetite/intake. Pt admitted this AM. PMH of kidney stones, alcohol abuse (drinks a fifth of whiskey per-day). He states that he has a clot in his lung and that his right lung hurts whenever he takes a deep breath in and out. He says he hurts everywhere. Pt reports he doesn't eat very much because he's not hungry, Pt denies n/v, but states for the last few days he has not been able to keep anything down. He states that he has lost about 10 pounds in the last month or so. Noted hx wt of 146 lbs x 60 days ago;  lbs. No sig wt loss noted. BMI 18.5, underweight. Pt was on a regular diet, pt now NPO. To order ONS once diet is advanced. Rec'd ensure clear d/t dx(s). Lytes WNL. RD following for shanel/intakes/wts. Admission/PMH: Admit d/t chest pain. PMHx of sepsis, UTI, alcohol abuse, TEJAS    MALNUTRITION ASSESSMENT  Context of Malnutrition: Acute Illness   Malnutrition Status: Insufficient data  Findings of the 6 clinical characteristics of malnutrition (Minimum of 2 out of 6 clinical characteristics is required to make the diagnosis of moderate or severe Protein Calorie Malnutrition based on AND/ASPEN Guidelines):  Energy Intake:  Unable to assess  Weight Loss:  No significant weight loss       NUTRITION DIAGNOSIS   Underweight related to inadequate protein-energy intake as evidenced by BMI    Nutrition Monitoring and Evaluation:   Food/Nutrient Intake Outcomes:  Food and Nutrient Intake, Supplement Intake  Physical Signs/Symptoms Outcomes:  Biochemical Data, Weight, Nausea or Vomiting, GI Status     OBJECTIVE DATA: Significant to nutrition assessment  Nutrition Related Findings: LLE trace edema.  Na 135  Wounds: None  Nutrition Goals: PO intake 75% or greater, prior to discharge     CURRENT NUTRITION THERAPIES  Diet NPO  PO Intake: Unable to assess   PO Supplement Intake:Unable to assess  Additional Sources of Calories/IVF:d5 @ 100 ml/hr     ANTHROPOMETRICS  Current Height: 6' 1\" (185.4 cm)  Current Weight: 139 lb 15.9 oz (63.5 kg)    Admission weight: 144 lb 3.2 oz (65.4 kg)  Ideal Body Weight (IBW): 184 lbs  (84 kg)    Usual Bodyweight   145 lbs   BMI: 18.5    COMPARATIVE STANDARDS  Energy (kcal):  3703-2309 (30-35 kcal/kg CBW)     Protein (g):   (1.5-2.0 g/kg CBW)       Fluid (mL/day):  1 ml/kcal or per MD discretion    The patient will be monitored per nutrition standards of care. Consult dietitian if additional nutrition interventions are needed prior to RD reassessment.      Amrik Yoo, [address]:  [phone]  Office:  [phone]

## 2022-12-09 NOTE — CONSULTS
[address] [place] [address]  GI Consultation      Patient: Jonah Wilson  : 1980       Date:  2022    Subjective:       History of Present Illness  Patient is a 36 y.o.  male with PMH alcohol abuse, admitted with Colitis [K52.9]  Chest pain [R07.9]  Anemia, unspecified type [D64.9]  Multiple subsegmental pulmonary emboli without acute cor pulmonale ([address] [I26.94] who is seen in consult for acute drop in Hgb. Poor appetite, reports he has not eaten in 2 weeks  Drinks a fifth of liquor daily  Lost 10 pounds in last month  Tachycardic to 140s on presentaton, given 1L of IVF  Hepatic steatosis, R colonic wall thickening and submucosal fatty infiltration, pericolic fat stranding of proximal transverse colon, mild pelvic free fluid on CT A/P w/ contrast  5 point drop in hgb since October ED visit  Hemoccult negative      Past Medical History:   Diagnosis Date    Bladder infection     Chronic kidney disease     ETOH abuse       Past Surgical History:   Procedure Laterality Date    CYSTOSCOPY  10/04/2016    CYSTOSCOPY, DVIU, LEFT RETROGRADE AND STENT PLACMENT    KIDNEY SURGERY  kidney stones      Past Endoscopic History none on file    Admission Meds  No current facility-administered medications on file prior to encounter. Current Outpatient Medications on File Prior to Encounter   Medication Sig Dispense Refill    tamsulosin (FLOMAX) 0.4 MG capsule Take 1 capsule by mouth daily for 7 doses 7 capsule 0    ondansetron (ZOFRAN) 4 MG tablet Take 1 tablet by mouth every 8 hours as needed for Nausea (Patient not taking: Reported on 10/15/2022) 20 tablet 0    ondansetron (ZOFRAN ODT) 4 MG disintegrating tablet Take 1 tablet by mouth every 8 hours as needed for Nausea or Vomiting (Patient not taking: Reported on 10/15/2022) 15 tablet 0       Patient denies ASA, NSAID use.     Allergies  Allergies   Allergen Reactions    Ampicillin       Social   Social History     Tobacco Use    Smoking status: Every Day     Packs/day: 1.00     Types: Cigarettes    Smokeless tobacco: Never   Substance Use Topics    Alcohol use: Yes     Comment: ETOH Abuse - 5 shots every other day of whiskey         No family history on file. No family history of colon cancer, Crohn's disease, or ulcerative colitis. Review of Systems  Pertinent items are noted in HPI. Physical Exam    /78   Pulse (!) 119   Temp 97 °F (36.1 °C) (Axillary)   Resp 15   Ht 6' 1\" (1.854 m)   Wt 139 lb 15.9 oz (63.5 kg)   SpO2 100%   BMI 18.47 kg/m²   General appearance: alert, cooperative, no distress, appears stated age  Anicteric, No Jaundice  Head: Normocephalic, without obvious abnormality  Lungs: clear to auscultation bilaterally, Normal Effort  Heart: regular rate and rhythm, normal S1 and S2, no murmurs or rubs  Abdomen:  TTP in midepigastrium to RUQ. Normal bowel sounds. Extremities: atraumatic, no cyanosis or edema  Skin: warm and dry  Neuro: intact  AAOX3    Data Review:    Recent Labs     12/08/22 2234 12/09/22 0430   WBC 7.2 8.6   HGB 8.7* 7.7*   HCT 24.5* 22.7*   .2* 119.7*    174     Recent Labs     12/08/22 2234 12/09/22 0430   * 135*   K 3.3* 3.9   CL 86* 89*   CO2 17* 18*   PHOS 2.6  --    BUN 10 9   CREATININE <0.5* 0.6*     Recent Labs     12/08/22 2234 12/09/22 0430   * 107*   ALT 21 20   BILIDIR 1.8*  --    BILITOT 3.1* 4.2*   ALKPHOS 255* 244*     Recent Labs     12/08/22 2234   LIPASE 18.0     Recent Labs     12/09/22 0430   PROTIME 17.5*   INR 1.44*     No results for input(s): PTT in the last 72 hours. No results for input(s): OCCULTBLD in the last 72 hours. Imaging Studies:                            Ultrasound:  N/A              CT-scan of abdomen and pelvis:   1. Incomplete diffuse hepatic steatosis. 2. Left renal pelvis calculus. 3. Focal area of nodular urothelial thickening of the proximal left ureter just distal to the calculus of the left renal pelvis.  This is favored to represent inflammatory lesion but neoplasm is not excluded. Recommend correlation and consider direct visualization. 4. Mild left hydronephrosis. 5. Lower pole left renal calculi. 6. Wall thickening and submucosal fatty infiltration of the right colon and proximal transverse colon with paracolic stranding consistent with colitis. 7. Mild free fluid pelvis. 8. Left femoral, external iliac, and common iliac vein thrombi. Assessment:     Principal Problem:    Chest pain  Resolved Problems:    * No resolved hospital problems. *    Mr Kay Rouse is a 36year old male with alcohol abuse who presents with megaloblastic anemia due to alcohol abuse and folate deficiency, and possible hemolytic anemia as well. Recommendations:     Megaloblastic Anemia 2/2 Folate Deficiency  Hemolytic Anemia suspected  Alcohol-Induced Macrocytic Anemia  Presenting Hgb of 8.7 folate deficiency and alcoholism. Reticulocytosis, elevated LDH and low fibrinogen suggest an underlying hemolytic component. GI bleed is unlikely given hemoccult negative, macrocytosis and elevated ferritin. No hematochezia, melena, hematemesis or other evidence of bleed. Patient drinks a fifth of liquor daily and reports poor appetite. Ferritin extremely elevated likely indicating inflammation rather than iron deficiency or hemorrhage.  - Replace Folate  - Follow up iron studies and hemolysis labs (haptoglobin, reticulocytes)  - Reticulocytes ordered  - Continue PPI prophylaxis  - Discuss risks of alcohol abuse  - CIWA protocol  - No need for EGD or colonoscopy at this time    Thank you for the opportunity to participate in [address] [place] [address].

## 2022-12-09 NOTE — PROGRESS NOTES
Checked pt's BS and it was 48. Followed hypoglycemia protocol and administered 125 ml bolus of 10% dextrose. Rechecked BS 15 mins after bolus finished and BS was 91. Pt remained asymptomatic.  Electronically signed by Roberto Leblanc RN on 12/9/2022 at 8:00 AM

## 2022-12-09 NOTE — PROGRESS NOTES
Internal Medicine Progress Note    Admit Date: 2022  Hospital day: 1  Patient name: Litzy Lamb   : 1980     CC: chest pain, trouble breathing    Interval history: Patient seen Jerrica Hernandez in bed this morning. He is not requiring supplemental oxygen. He says he's not short of breath while laying in bed, but is as soon as he walks 10-15 feet. He said last night that it felt like his heart was beating out of his chest, but he isn't having that feeing this morning. He is also experiencing right flank pain since after a physical exam someone did overnight. He doesn't have a headache, but does feel a little lightheaded and dizzy. He reports 2 weeks of some constipation; denies diarrhea or abdominal pain. He has no dysuria. Patient reports weight loss but can't quatify the amount or timeline of when it started. He reports decreased appetite the past few weeks. He said at baseline, he eats twice a day. He reports drinking a 5th of whiskey daily and has been drinking heavily for 20 years. He also reports smoking a pack / day of cigarettes.        Medications   Scheduled Meds:   sodium chloride flush  5-40 mL IntraVENous 2 times per day    sodium chloride flush  5-40 mL IntraVENous 2 times per day    pantoprazole  40 mg IntraVENous BID    thiamine (VITAMIN B1) IVPB  250 mg IntraVENous Q24H     Continuous Infusions:   sodium chloride      sodium chloride 25 mL (22 0552)    dextrose      dextrose 5 % and 0.9 % NaCl 75 mL/hr at 22 0959     Objective   Vitals  T-max:  Patient Vitals for the past 8 hrs:   BP Temp Temp src Pulse Resp SpO2 Height Weight   22 0743 126/78 97 °F (36.1 °C) Axillary (!) 119 15 100 % -- --   22 0705 125/80 -- -- (!) 121 -- -- -- --   22 0557 110/78 -- -- (!) 101 -- -- -- --   22 0416 135/82 98.1 °F (36.7 °C) -- (!) 116 14 100 % 6' 1\" (1.854 m) 139 lb 15.9 oz (63.5 kg)   220 124/80 -- -- (!) 114 15 100 % -- --   22 0230 118/79 -- -- Laura Kim 106 15 100 % -- --       Intake/Output Summary (Last 24 hours) at 12/9/2022 1026  Last data filed at 12/9/2022 0122  Gross per 24 hour   Intake 1000 ml   Output --   Net 1000 ml       ROS: A 10 point review of systems was conducted and significant findings are noted in the interval history. Physical Exam  Constitutional:       General: He is not in acute distress. Appearance: He is ill-appearing. HENT:      Head: Normocephalic and atraumatic. Mouth/Throat:      Pharynx: Oropharynx is clear. Eyes:      Extraocular Movements: Extraocular movements intact. Pupils: Pupils are equal, round, and reactive to light. Cardiovascular:      Rate and Rhythm: Regular rhythm. Tachycardia present. Pulmonary:      Effort: Pulmonary effort is normal. No respiratory distress. Breath sounds: No wheezing. Comments: Slightly diminished breath sound in RLL  Abdominal:      General: Bowel sounds are normal.      Palpations: Abdomen is soft. Comments: Mild RLQ tenderness, only on palpation   Musculoskeletal:      Right lower leg: No edema. Left lower leg: No edema. Skin:     General: Skin is warm and dry. Neurological:      General: No focal deficit present. Mental Status: He is alert and oriented to person, place, and time.    Psychiatric:         Behavior: Behavior normal.       LABS  CBC:   Recent Labs     12/08/22 2234 12/09/22 0430   WBC 7.2 8.6   HGB 8.7* 7.7*   HCT 24.5* 22.7*    174   .2* 119.7*     Renal:    Recent Labs     12/08/22 2234 12/09/22 0430   * 135*   K 3.3* 3.9   CL 86* 89*   CO2 17* 18*   BUN 10 9   CREATININE <0.5* 0.6*   GLUCOSE 92 66*   CALCIUM 8.7 9.0   MG 1.80 1.70*   PHOS 2.6  --    ANIONGAP 29* 28*     Hepatic:   Recent Labs     12/08/22 2234 12/09/22  0430   * 107*   ALT 21 20   BILITOT 3.1* 4.2*   BILIDIR 1.8*  --    PROT 7.2 6.9   LABALBU 3.4 3.4   ALKPHOS 255* 244*     Troponin:   Recent Labs     12/08/22 2234   TROPONINI <0.01     INR:   Recent Labs     12/09/22  0430   INR 1.44*     -----------------------------------------------------------------  Imaging:  CT ABDOMEN PELVIS W IV CONTRAST Additional Contrast? None   Final Result   Impression:   1. Incomplete diffuse hepatic steatosis. 2. Left renal pelvis calculus. 3. Focal area of nodular urothelial thickening of the proximal left ureter just distal to the calculus of the left renal pelvis. This is favored to represent inflammatory lesion but neoplasm is not excluded. Recommend correlation and consider direct    visualization. 4. Mild left hydronephrosis. 5. Lower pole left renal calculi. 6. Wall thickening and submucosal fatty infiltration of the right colon and proximal transverse colon with paracolic stranding consistent with colitis. 7. Mild free fluid pelvis. 8. Left femoral, external iliac, and common iliac vein thrombi. CT CHEST PULMONARY EMBOLISM W CONTRAST   Final Result   Impression:   1. Small peripheral right lower lobe pulmonary embolism new from 10/15/2022, otherwise age-indeterminate. 2. Stable left lower lobe segmental pulmonary embolism. 3. 2.5 x 1.8 cm right lower lobe nodular opacity. Differential could include pulmonary infarct, neoplasm, atelectasis, or infectious/inflammatory lesion. Recommend close follow-up. 4. Centrilobular emphysema. XR CHEST (2 VW)   Final Result   Impression:   No airspace disease. Assessment and Plan   Acute RLL PE and Sub acute LLL PE  Left femoral, external iliac, common iliac vein thrombi  CTPA: Small peripheral right lower lobe pulmonary embolism new from 10/15/2022. right lower lobe nodular opacity concerns for neoplasm. Stable LLL segmental PE. Centrilobular emphysema (pack a day smoker). Not requiring supplemental oxygen.    -Patient is hemodynamically stable, though tachycardic.  -Hold heparin gtt due to concern for acute GIB.  IR for IVC filter placement today.   -Consult Heme/onc for hypercoagulability work-up. Acute Megaloblastic Anemia  Macrocytosis likely 2/2 Alcohol use disorder   Acute anemia likely 2/2 GI bleed vs hemolysis    Hemoglobin at 7.7, down from 13.5 in October. Patient drinks a 5th of whiskey daily and reports being a heavy drinker for the past 20 years. He reports no hemoptysis or hematemesis. Also no blood in stool or dark stool (patient actually complains of constipation over the past 2 weeks). Occult blood negative.    -Will consult GI given large drop  -PPI twice daily  -Nursing communication placed 2 large-bore IV's  -Type and screen  -f/u folate, B12, iron studies    Lactic acidosis likely 2/2 large daily alcohol use, Acute anemia, and PE    High up to 18.2 at the highest. Now at 14.7.   -IVF  -Trend lactates Q3hrs      Nodular urothelial thickening of ureter   Patient reports a history of many kidney stones in the past, particularly on the left side.   -Consult urology  -UA to check for infection. Elevated procal at 0.62. Transaminitis likely 2/2 Alcoholic hepatitis   Alk phos 255, ALT 21, , bili 3.1, direct 1.8  -continue to monitor, CMP daily     Alcohol use disorder   With Alcoholic starvation ketosis  Anion gap of 29 bicarb of 17, normal creatinine  Drinks 1/5 of whiskey daily, last drink 3 hours before arriving to the ED  -CIWA with Ativan. Librium scheduled 25mg every 6 hrs.   -thiamine     Code Status: Full Code   FEN: Diet NPO   PPX: Protonix, SCDs  DISPO: VALERIE Hayes MD, PGY-1  12/09/22  10:26 AM    This patient will be staffed and discussed with Clair Slade MD.     Patient seen and examined, plan of care discussed with residents. Agree with their assessment and plan with following addendum:  Complicated patient with DVT PE, acute anemia, lactic acidosis, alcohol abuse. Patient had recent COVID which could have been his provoking factor for DVT PE.   There is hesitancy to initiate anticoagulation given significant drop in hemoglobin. Will consult IR for IVC filter placement while investigating the cause of his anemia. There is no evidence of bleeding so far, etiology may be related to other factors, such as hemolysis or DIC. We will add septic work-up, will obtain urinalysis. Start antibiotics if urinalysis is abnormal as patient have abnormality of his  tract on CAT scan. Continue with IV fluid resuscitation and serial lactic acid checks-it is improving.        Carlitos Garcia MD

## 2022-12-09 NOTE — CONSULTS
[facility]   Consult Note      Reason for Consult:  Multiple PE and thrombi  Requesting Physician:  Dr. Rosa Albert MD    CHIEF COMPLAINT:  Chest pain    History Obtained From: Patient, EMR. HISTORY OF PRESENT ILLNESS:    The patient is a 36 y.o with PMHx of alcoholic liver cirrhosis, renal stones who presented to the ED with complaints of chest pain and shortness of breath. Routine stating, the patient had a recent hospitalization visit on 10/15/2022 for complaints of left calf swelling and chest pain and was found to have a left lower lobe subsegmental pulmonary embolus. He was supposed to be transferred to the [facility] but left AMA. Since then the patient has been progressively getting worse and decided to come to the ED. He was not even able to go 10 feet without getting short of breath. In the ED the patient was afebrile, tachycardic up to 140s with a normal blood pressure. Labs concerning for hypokalemia ( 3.3 mmol/L), Lactic acidosis 18.2, , elevated bilirubin (3.1 mg/dL) and Alkaline phosphatase  255 U/L. Hemoglobin drop from baseline 13.7 g/dl to 8.7 g/dl on arrival. .2, Platelet count of 227 K/uL, CT chest revealed a right lower lobe PE which was new from CT done at 10/15/22. AN opacity measuring 2.5 x 1.8 cm in the Right lower lobe was also seen. Seen and examined today, resting comfortably in the bed. He was on room air and mentioned right sided chest pain which has stayed the same since admission. He reports that he has drank a fifth of the liquor daily for years and has lost about 10 lbs weight in the last month. He has poor appetite. Past Medical History:     has a past medical history of Bladder infection, Chronic kidney disease, and ETOH abuse.    Past Surgical History:    Past Surgical History:   Procedure Laterality Date    CYSTOSCOPY  10/04/2016    CYSTOSCOPY, DVIU, LEFT RETROGRADE AND STENT PLACMENT    KIDNEY SURGERY  kidney stones      Current Medications:     sodium chloride flush  5-40 mL IntraVENous 2 times per day    sodium chloride flush  5-40 mL IntraVENous 2 times per day    pantoprazole  40 mg IntraVENous BID    thiamine (VITAMIN B1) IVPB  250 mg IntraVENous Q24H     Allergies: Allergies   Allergen Reactions    Ampicillin       Social History:    reports that he has been smoking cigarettes. He has been smoking an average of 1 pack per day. He has never used smokeless tobacco. He reports current alcohol use. He reports that he does not currently use drugs after having used the following drugs: Marijuana [place] [place]). Family History:     family history is not on file. REVIEW OF SYSTEMS:        PHYSICAL EXAM:    Vitals:  Vitals:    12/09/22 0743   BP: 126/78   Pulse: (!) 119   Resp: 15   Temp: 97 °F (36.1 °C)   SpO2: 100%      Gen: Aox3. CVS: Regular rate, rhythm. No murmurs, gallops, rubs. Pulm: CTA b/l. No wheezes, rales, rhonchi. Abdomen: Soft, non-distended, non-tender. Neuro: AOX3. No focal deficits.     DATA:  General Labs:    CBC:   Recent Labs     12/08/22 2234 12/09/22  0430   WBC 7.2 8.6   HGB 8.7* 7.7*   HCT 24.5* 22.7*   .2* 119.7*    174     BMP:   Recent Labs     12/08/22 2234 12/09/22  0430   * 135*   K 3.3* 3.9   CL 86* 89*   CO2 17* 18*   PHOS 2.6  --    BUN 10 9   CREATININE <0.5* 0.6*     LIVER PROFILE:   Recent Labs     12/08/22 2234 12/09/22  0430   * 107*   ALT 21 20   LIPASE 18.0  --    BILIDIR 1.8*  --    BILITOT 3.1* 4.2*   ALKPHOS 255* 244*     PT/INR:    Lab Results   Component Value Date/Time    PROTIME 17.5 12/09/2022 04:30 AM    PROTIME 13.1 10/15/2022 04:35 PM    PROTIME 11.4 10/08/2016 03:59 AM    INR 1.44 12/09/2022 04:30 AM    INR 1.00 10/15/2022 04:35 PM    INR 1.00 10/08/2016 03:59 AM     PTT:    Lab Results   Component Value Date/Time    APTT 34.5 12/09/2022 04:30 AM    APTT 29.3 10/15/2022 04:35 PM    APTT 27.9 10/06/2016 04:40 AM     UA:No results for input(s): NITRITE, COLORU, PHUR, LABCAST, WBCUA, RBCUA, MUCUS, TRICHOMONAS, YEAST, BACTERIA, CLARITYU, SPECGRAV, LEUKOCYTESUR, UROBILINOGEN, BILIRUBINUR, BLOODU, GLUCOSEU, AMORPHOUS in the last 72 hours. Invalid input(s): KETONESU  Magnesium:    Lab Results   Component Value Date/Time    MG 1.70 12/09/2022 04:30 AM    MG 1.80 12/08/2022 10:34 PM    MG 1.60 10/15/2022 04:35 PM     CHRIS:  No results found for: ANATITER, CHRIS  Uric Acid:  No components found for: URIC    Imaging:  XR CHEST (2 VW)    Result Date: 12/8/2022  Chest x-ray 2 views. Indication: Chest pain. Comparison study: Chest x-ray from 6/3/2014. Findings: Mediastinal and cardiac silhouettes normal in size. No pneumothorax. Bicuspid granulomas lungs. No airspace disease or effusion. Impression: No airspace disease. CT ABDOMEN PELVIS W IV CONTRAST Additional Contrast? None    Result Date: 12/9/2022  CT abdomen and pelvis with contrast. Indication: Abdominal pain. Nausea and vomiting. Comparison study: CT abdomen and pelvis from 6/29/2022. Procedure comments: Following intravenous administration 80 mL Isovue-370 axial CT images obtained through the abdomen and pelvis. Up-to-date CT equipment and radiation dose reduction techniques utilized. Findings: Severe hepatic steatosis with focal fatty sparing along margin of the gallbladder fossa. Normal gallbladder. Normal pancreas. Normal spleen. Normal adrenal glands. 7 x 5 mm left renal pelvis calculus on series 4 image 55. Soft tissue thickening of the proximal left ureter just distal to the calculus measuring 1.2 x 1.4 cm series 4 image 58. Mild left hydronephrosis. Lower pole left renal calculus measuring 1.2 x 1.0 cm. Atherosclerotic calcifications aorta and aortic branches. Thrombus of the left common and left external iliac veins as well as the left common femoral vein. Atherosclerotic calcifications aorta and aortic branches. No retroperitoneal or mesenteric lymphadenopathy. Normal bladder.  Normal prostate. Wall thickening and submucosal fatty infiltration of the right colon and proximal transverse colon representative series 4 image 58 with mild paracolic stranding. Normal appendix. No transition point small bowel caliber. No free air. Degenerative change spine. Mild free fluid pelvis. Impression: 1. Incomplete diffuse hepatic steatosis. 2. Left renal pelvis calculus. 3. Focal area of nodular urothelial thickening of the proximal left ureter just distal to the calculus of the left renal pelvis. This is favored to represent inflammatory lesion but neoplasm is not excluded. Recommend correlation and consider direct visualization. 4. Mild left hydronephrosis. 5. Lower pole left renal calculi. 6. Wall thickening and submucosal fatty infiltration of the right colon and proximal transverse colon with paracolic stranding consistent with colitis. 7. Mild free fluid pelvis. 8. Left femoral, external iliac, and common iliac vein thrombi. CT CHEST PULMONARY EMBOLISM W CONTRAST    Result Date: 12/9/2022  CTPA. Indication: Chest pain. History of pulmonary embolism. Comparison study: CTPA from 10/15/2022. Procedure comments: Following intravenous administration 80 mL Isovue-370 volumetric axial CT images obtained through the chest with coronal maximum intensity projection reconstructions performed on CT workstation. Up-to-date CT equipment and radiation dose reduction techniques utilized. Findings: Left lower lobe lateral basilar segmental pulmonary embolism series 2 image 158 stable compared to 10/15/2022. Right lower lobe posterior segmental acute pulmonary embolism on series 2 image 166 new from 10/15/2022. 2.5 x 1.8 cm right lower lobe noncalcified pulmonary nodule on series 2 image 208 stable from 10/15/2022. Calcified granulomas. Centrilobular emphysema. Paraseptal emphysema.  3 mm right upper lobe noncalcified nodule on series 2 image 97 stable from 10/15/2022. 4 mm right upper lobe noncalcified point nodule series 2 image 124 stable from 10/15/2022. 2 mm middle lobe noncalcified nodule series 2 image 152 stable. 3 mm middle lobe noncalcified subpleural pulmonary nodule image 172 stable. 6 mm nodular opacity left lower lobe image 221 decreased in size. 2 mm left lower lobe noncalcified pulmonary nodule image 166 stable. Mild scattered groundglass opacity lungs. 3 mm left upper lobe noncalcified nodule image 114 stable. 6 mm nodular opacity lingula image 214 stable. No pleural or pericardial effusion. Thoracic aortic calcification. No mediastinal hilar or axillary lymphadenopathy. Mild degenerative change spine. Impression: 1. Small peripheral right lower lobe pulmonary embolism new from 10/15/2022, otherwise age-indeterminate. 2. Stable left lower lobe segmental pulmonary embolism. 3. 2.5 x 1.8 cm right lower lobe nodular opacity. Differential could include pulmonary infarct, neoplasm, atelectasis, or infectious/inflammatory lesion. Recommend close follow-up. 4. Centrilobular emphysema. IMPRESSION/RECOMMENDATIONS:  36 y.o M with PMHx of Alcohol abuse and a recent ED visit is admitted in the hospital for PE and evaluation of anemia. There has been a significant drop in Hb since October. Bilirubinemia  Elevated PT/INR  Patient not on Anticoagulation for PE due to anemia. Extensive lower extremity DVT's. LDH elevated. Ordered Haptoglobin though it would be low due to liver dysfunction. F/U Coomb's test.    Patient would need an IVC filter placement to avoid massive/sub-massive PE in this gentleman. Please call or PS with any questions or concerns.   This patient will be staffed and discussed with the attending: Dr. Elle Burch MD.    Christina South MD PGY-2  Internal Medicine Resident  [phone]

## 2022-12-09 NOTE — H&P
Internal Medicine  PGY 2  History & Physical      CC chest pain     History Obtained From:  patient    HISTORY OF PRESENT ILLNESS:  This is a 48-year-old male with PMH of kidney stones, alcohol abuse (drinks a fifth of whiskey per-day) he presents to the ED with generalized feeling unwell. He states that he has a clot in his lung and that his right lung hurts whenever he takes a deep breath in and out. He cannot go 10 feet without getting short of breath. He hurts everywhere, he states everywhere hurts. He states that he does not eat very much because he is not hungry he denies any nausea or vomiting. But he states for the last few days he has not been able to keep anything down. He states that he has lost about 10 pounds in the last month or so. On 10/15 he went to the [facility] ED for left calf swelling and was found to have Left lower lobe basilar segmental pulmonary emboli. Small areas of rounded subpleural airspace consolidation in the bilateral lower lobes, nonspecific and could be related to pulmonary infarcts but an infectious/inflammatory process or atelectasis are also possible. In the ED he was afebrile tachycardic to the 140s blood pressures 127/90. He was given a liter of fluids. And some Zofran. Labs significant for sodium of 132 potassium of 3.3 chloride of 86 bicarb 17 anion gap of 29. Alk phos 255 ALT 21  bili 3.1 direct bili 1.8 indirect 1.3 CTPA was repeated. That showed  small peripheral right lower lobe PE a new that is new and is stable left lower lobe second toe PE with a 2.5 x 1.8 cm right lower lobe nodular opacity. A CT abdomen pelvis with contrast done showed diffuse hepatic steatosis, left renal pelvis calculus, focal areas of nodular urothelial thickening of the proximal left ureter possible inflammatory versus neoplasm.   Mild left hydronephrosis, lower pole left renal calculi, wall thickening and submucosal fatty infiltration of the right colon and proximal transverse colon with pericolic stranding consistent with colitis mild free fluid in the pelvis, left femoral, external iliac and common iliac vein thrombi. He was not started on any heparin due to a five-point drop in his hemoglobin from last ED visit in October. Digital rectal exam showed brown stool no stool in the vault. Hemoccult was negative. Past Medical History:        Diagnosis Date    Bladder infection     Chronic kidney disease     ETOH abuse        Past Surgical History:        Procedure Laterality Date    CYSTOSCOPY  10/04/2016    CYSTOSCOPY, DVIU, LEFT RETROGRADE AND STENT PLACMENT    KIDNEY SURGERY  kidney stones       Medications Priorto Admission:    Not in a hospital admission. Allergies:  Ampicillin    Social History:   TOBACCO:   reports that he has been smoking cigarettes. He has been smoking an average of 1 pack per day. He has never used smokeless tobacco.  ETOH:   reports current alcohol use. DRUGS : none known  Patient currently lives with family wife and kids    Family History:   No family history on file. Review of Systems   Constitutional:  Positive for fatigue and unexpected weight change. Negative for fever. Respiratory:  Positive for shortness of breath. Negative for apnea. Cardiovascular:  Positive for chest pain (pleuritic RL flank area). Gastrointestinal:  Negative for abdominal distention. Genitourinary:  Negative for difficulty urinating. Musculoskeletal:  Negative for arthralgias. Skin:  Negative for color change. Neurological:  Negative for dizziness. Psychiatric/Behavioral:  Negative for agitation. ROS: A 10 point review of systems was conducted, significant findings as noted in HPI. Physical Exam  Constitutional:       Appearance: Normal appearance. HENT:      Head: Normocephalic and atraumatic. Nose: Nose normal.      Mouth/Throat:      Mouth: Mucous membranes are dry. Eyes:      Extraocular Movements: Extraocular movements intact. Pupils: Pupils are equal, round, and reactive to light. Cardiovascular:      Rate and Rhythm: Normal rate and regular rhythm. Pulmonary:      Effort: Pulmonary effort is normal. No respiratory distress. Breath sounds: Normal breath sounds. No wheezing or rales. Abdominal:      General: Abdomen is flat. There is no distension. Palpations: Abdomen is soft. Tenderness: There is abdominal tenderness. Comments: Epigastric tenderness to palpation   Genitourinary:     Penis: Normal.    Musculoskeletal:         General: Normal range of motion. Skin:     General: Skin is warm and dry. Neurological:      General: No focal deficit present. Mental Status: He is alert and oriented to person, place, and time. Psychiatric:         Mood and Affect: Mood normal.     Physical exam:       Vitals:    12/09/22 0200   BP: 113/77   Pulse: (!) 107   Resp: 14   Temp:    SpO2: 100%       DATA:    Labs:  CBC:   Recent Labs     12/08/22 2234   WBC 7.2   HGB 8.7*   HCT 24.5*          BMP:   Recent Labs     12/08/22 2234   *   K 3.3*   CL 86*   CO2 17*   BUN 10   CREATININE <0.5*   GLUCOSE 92   PHOS 2.6     LFT's:   Recent Labs     12/08/22 2234   *   ALT 21   BILITOT 3.1*   ALKPHOS 255*     Troponin:   Recent Labs     12/08/22 2234   TROPONINI <0.01     BNP:No results for input(s): BNP in the last 72 hours. ABGs: No results for input(s): PHART, TSI1VDF, PO2ART in the last 72 hours. INR: No results for input(s): INR in the last 72 hours. U/A:No results for input(s): NITRITE, COLORU, PHUR, LABCAST, WBCUA, RBCUA, MUCUS, TRICHOMONAS, YEAST, BACTERIA, CLARITYU, SPECGRAV, LEUKOCYTESUR, UROBILINOGEN, BILIRUBINUR, BLOODU, GLUCOSEU, AMORPHOUS in the last 72 hours. Invalid input(s): KETONESU    CT ABDOMEN PELVIS W IV CONTRAST Additional Contrast? None   Final Result   Impression:   1. Incomplete diffuse hepatic steatosis. 2. Left renal pelvis calculus.    3. Focal area of nodular urothelial thickening of the proximal left ureter just distal to the calculus of the left renal pelvis. This is favored to represent inflammatory lesion but neoplasm is not excluded. Recommend correlation and consider direct    visualization. 4. Mild left hydronephrosis. 5. Lower pole left renal calculi. 6. Wall thickening and submucosal fatty infiltration of the right colon and proximal transverse colon with paracolic stranding consistent with colitis. 7. Mild free fluid pelvis. 8. Left femoral, external iliac, and common iliac vein thrombi. CT CHEST PULMONARY EMBOLISM W CONTRAST   Final Result   Impression:   1. Small peripheral right lower lobe pulmonary embolism new from 10/15/2022, otherwise age-indeterminate. 2. Stable left lower lobe segmental pulmonary embolism. 3. 2.5 x 1.8 cm right lower lobe nodular opacity. Differential could include pulmonary infarct, neoplasm, atelectasis, or infectious/inflammatory lesion. Recommend close follow-up. 4. Centrilobular emphysema. XR CHEST (2 VW)   Final Result   Impression:   No airspace disease. ASSESSMENT AND PLAN:    Acute RLL PE/ Sub acute LLL PE  Left femoral, external iliac, common iliac vein thrombi  Found on CTPA. CT with right lower lobe nodular opacity concerns for neoplasm.  -Patient is hemodynamically stable at the moment though tachycardic  -Hold heparin gtt due to concern for acute GIB  - Consult Heme/onc for hypercoagulability work-up  - consider pulm or IR consult for biopsy of RLL opacity    Acute Anemia  Hemoglobin 8.7 down from 13.5 in October. No signs of overt bleeding. Hemoccult negative.  Macrocytic  - repeat CBC 7.7, will consult GI given large drop  -PPI twice daily  -Repeat Hemoccult  -Nursing communication placed 2 large-bore IV's  -Type and screen  - f/u folate, B12, iron studies    Nodular urothelial thickening of ureter   -Consult urology    Colitis  no diarrhea at this time  -Hold off on antibiotics  - continue to monitor    Transaminitis  Likely alcoholic hepatitis  Alk phos 255, ALT 21, , bili 3.1, direct 1.8  - continue to monitor, CMP daily    Alcohol abuse  With Alcoholic starvation ketosis  Anion gap of 29 bicarb of 17, normal creatinine  Drinks 1/5 of whiskey daily, last drink 3 hours before arriving to the ED  -CIWA with Ativan  - thiamine     Will discuss with attending physician Dr. Alvaro Brannon Status:Full code   FEN: Diet NPO   PPX: SCD  DISPO: IP    Brent Montanez MD  12/9/2022,  2:47 AM

## 2022-12-09 NOTE — PROGRESS NOTES
4 Eyes Skin Assessment     The patient is being assess for   Admission    I agree that 2 RN's have performed a thorough Head to Toe Skin Assessment on the patient. ALL assessment sites listed below have been assessed. Areas assessed for pressure by both nurses:   [x]   Head, Face, and Ears   [x]   Shoulders, Back, and Chest, Abdomen  [x]   Arms, Elbows, and Hands   [x]   Coccyx, Sacrum, and Ischium  [x]   Legs, Feet, and Heels        Skin Assessed Under all Medical Devices by both nurses:  NA               All Mepilex Borders were peeled back and area peeked at by both nurses:  No: NA  Please list where Mepilex Borders are located:  NA             **SHARE this note so that the co-signing nurse is able to place an eSignature**    Co-signer eSignature: Electronically signed by Shanda Donis RN on 12/9/22 at 7:50 AM EST    Does the Patient have Skin Breakdown related to pressure?   No     (Insert Photo here)         Eric Prevention initiated:  No   Wound Care Orders initiated:  NA      [facility] nurse consulted for Pressure Injury (Stage 3,4, Unstageable, DTI, NWPT, Complex wounds)and New or Established Ostomies:  NA      Primary Nurse eSignature: Electronically signed by Celso Etienne RN on 12/9/22 at 7:57 AM EST

## 2022-12-10 VITALS
HEIGHT: 73 IN | DIASTOLIC BLOOD PRESSURE: 67 MMHG | TEMPERATURE: 98.5 F | HEART RATE: 118 BPM | RESPIRATION RATE: 18 BRPM | WEIGHT: 140.43 LBS | BODY MASS INDEX: 18.61 KG/M2 | SYSTOLIC BLOOD PRESSURE: 94 MMHG | OXYGEN SATURATION: 100 %

## 2022-12-10 LAB
A/G RATIO: 0.9 (ref 1.1–2.2)
ALBUMIN SERPL-MCNC: 2.8 G/DL (ref 3.4–5)
ALP BLD-CCNC: 174 U/L (ref 40–129)
ALT SERPL-CCNC: 16 U/L (ref 10–40)
ANION GAP SERPL CALCULATED.3IONS-SCNC: 12 MMOL/L (ref 3–16)
APTT: 35.5 SEC (ref 23–34.3)
AST SERPL-CCNC: 96 U/L (ref 15–37)
BASOPHILS ABSOLUTE: 0 K/UL (ref 0–0.2)
BASOPHILS RELATIVE PERCENT: 0.2 %
BILIRUB SERPL-MCNC: 4.2 MG/DL (ref 0–1)
BLOOD CULTURE, ROUTINE: NORMAL
BUN BLDV-MCNC: 8 MG/DL (ref 7–20)
CALCIUM SERPL-MCNC: 7.7 MG/DL (ref 8.3–10.6)
CHLORIDE BLD-SCNC: 95 MMOL/L (ref 99–110)
CO2: 22 MMOL/L (ref 21–32)
CREAT SERPL-MCNC: <0.5 MG/DL (ref 0.9–1.3)
CULTURE, BLOOD 2: NORMAL
EOSINOPHILS ABSOLUTE: 0.2 K/UL (ref 0–0.6)
EOSINOPHILS RELATIVE PERCENT: 6 %
FIBRINOGEN: 141 MG/DL (ref 207–509)
GFR SERPL CREATININE-BSD FRML MDRD: >60 ML/MIN/{1.73_M2}
GLUCOSE BLD-MCNC: 139 MG/DL (ref 70–99)
GLUCOSE BLD-MCNC: 168 MG/DL (ref 70–99)
HAPTOGLOBIN: 77 MG/DL (ref 30–200)
HAPTOGLOBIN: 77 MG/DL (ref 30–200)
HAPTOGLOBIN: 79 MG/DL (ref 30–200)
HCT VFR BLD CALC: 21.3 % (ref 40.5–52.5)
HEMOGLOBIN: 7.5 G/DL (ref 13.5–17.5)
INR BLD: 1.68 (ref 0.87–1.14)
LACTIC ACID: 2.1 MMOL/L (ref 0.4–2)
LACTIC ACID: 2.9 MMOL/L (ref 0.4–2)
LACTIC ACID: 3 MMOL/L (ref 0.4–2)
LYMPHOCYTES ABSOLUTE: 0.6 K/UL (ref 1–5.1)
LYMPHOCYTES RELATIVE PERCENT: 14.4 %
MAGNESIUM: 1.9 MG/DL (ref 1.8–2.4)
MCH RBC QN AUTO: 37.4 PG (ref 26–34)
MCHC RBC AUTO-ENTMCNC: 35.1 G/DL (ref 31–36)
MCV RBC AUTO: 106.6 FL (ref 80–100)
MONOCYTES ABSOLUTE: 0.1 K/UL (ref 0–1.3)
MONOCYTES RELATIVE PERCENT: 1.7 %
NEUTROPHILS ABSOLUTE: 3 K/UL (ref 1.7–7.7)
NEUTROPHILS RELATIVE PERCENT: 77.7 %
PDW BLD-RTO: 31 % (ref 12.4–15.4)
PERFORMED ON: ABNORMAL
PHOSPHORUS: 0.5 MG/DL (ref 2.5–4.9)
PHOSPHORUS: 0.6 MG/DL (ref 2.5–4.9)
PLATELET # BLD: 125 K/UL (ref 135–450)
PMV BLD AUTO: 7.8 FL (ref 5–10.5)
POTASSIUM SERPL-SCNC: 3.4 MMOL/L (ref 3.5–5.1)
PROTHROMBIN TIME: 19.7 SEC (ref 11.7–14.5)
RBC # BLD: 2 M/UL (ref 4.2–5.9)
SODIUM BLD-SCNC: 129 MMOL/L (ref 136–145)
TOTAL PROTEIN: 5.8 G/DL (ref 6.4–8.2)
WBC # BLD: 3.8 K/UL (ref 4–11)

## 2022-12-10 PROCEDURE — 2580000003 HC RX 258: Performed by: STUDENT IN AN ORGANIZED HEALTH CARE EDUCATION/TRAINING PROGRAM

## 2022-12-10 PROCEDURE — 6370000000 HC RX 637 (ALT 250 FOR IP): Performed by: INTERNAL MEDICINE

## 2022-12-10 PROCEDURE — 2500000003 HC RX 250 WO HCPCS: Performed by: STUDENT IN AN ORGANIZED HEALTH CARE EDUCATION/TRAINING PROGRAM

## 2022-12-10 PROCEDURE — 84100 ASSAY OF PHOSPHORUS: CPT

## 2022-12-10 PROCEDURE — 6370000000 HC RX 637 (ALT 250 FOR IP): Performed by: STUDENT IN AN ORGANIZED HEALTH CARE EDUCATION/TRAINING PROGRAM

## 2022-12-10 PROCEDURE — 80053 COMPREHEN METABOLIC PANEL: CPT

## 2022-12-10 PROCEDURE — 6370000000 HC RX 637 (ALT 250 FOR IP)

## 2022-12-10 PROCEDURE — 85384 FIBRINOGEN ACTIVITY: CPT

## 2022-12-10 PROCEDURE — 83010 ASSAY OF HAPTOGLOBIN QUANT: CPT

## 2022-12-10 PROCEDURE — 85610 PROTHROMBIN TIME: CPT

## 2022-12-10 PROCEDURE — 6360000002 HC RX W HCPCS: Performed by: STUDENT IN AN ORGANIZED HEALTH CARE EDUCATION/TRAINING PROGRAM

## 2022-12-10 PROCEDURE — 36415 COLL VENOUS BLD VENIPUNCTURE: CPT

## 2022-12-10 PROCEDURE — 83605 ASSAY OF LACTIC ACID: CPT

## 2022-12-10 PROCEDURE — C9113 INJ PANTOPRAZOLE SODIUM, VIA: HCPCS | Performed by: STUDENT IN AN ORGANIZED HEALTH CARE EDUCATION/TRAINING PROGRAM

## 2022-12-10 PROCEDURE — 83735 ASSAY OF MAGNESIUM: CPT

## 2022-12-10 PROCEDURE — 2580000003 HC RX 258: Performed by: INTERNAL MEDICINE

## 2022-12-10 PROCEDURE — 85730 THROMBOPLASTIN TIME PARTIAL: CPT

## 2022-12-10 PROCEDURE — 6360000002 HC RX W HCPCS: Performed by: INTERNAL MEDICINE

## 2022-12-10 PROCEDURE — 85025 COMPLETE CBC W/AUTO DIFF WBC: CPT

## 2022-12-10 RX ORDER — FOLIC ACID 1 MG/1
5 TABLET ORAL DAILY
Qty: 30 TABLET | Refills: 3 | Status: SHIPPED | OUTPATIENT
Start: 2022-12-11

## 2022-12-10 RX ORDER — FOLIC ACID 1 MG/1
5 TABLET ORAL DAILY
Status: DISCONTINUED | OUTPATIENT
Start: 2022-12-10 | End: 2022-12-10 | Stop reason: HOSPADM

## 2022-12-10 RX ORDER — PANTOPRAZOLE SODIUM 40 MG/1
40 TABLET, DELAYED RELEASE ORAL DAILY
Qty: 30 TABLET | Refills: 0 | Status: SHIPPED | OUTPATIENT
Start: 2022-12-10

## 2022-12-10 RX ORDER — THIAMINE MONONITRATE (VIT B1) 100 MG
100 TABLET ORAL DAILY
Qty: 30 TABLET | Refills: 3 | Status: SHIPPED | OUTPATIENT
Start: 2022-12-10

## 2022-12-10 RX ORDER — DOCUSATE SODIUM 100 MG/1
100 CAPSULE, LIQUID FILLED ORAL DAILY
Status: DISCONTINUED | OUTPATIENT
Start: 2022-12-10 | End: 2022-12-10 | Stop reason: HOSPADM

## 2022-12-10 RX ORDER — TAMSULOSIN HYDROCHLORIDE 0.4 MG/1
0.4 CAPSULE ORAL DAILY
Status: DISCONTINUED | OUTPATIENT
Start: 2022-12-10 | End: 2022-12-10 | Stop reason: HOSPADM

## 2022-12-10 RX ORDER — TAMSULOSIN HYDROCHLORIDE 0.4 MG/1
0.4 CAPSULE ORAL DAILY
Status: DISCONTINUED | OUTPATIENT
Start: 2022-12-10 | End: 2022-12-10

## 2022-12-10 RX ORDER — POLYETHYLENE GLYCOL 3350 17 G/17G
17 POWDER, FOR SOLUTION ORAL DAILY
Status: DISCONTINUED | OUTPATIENT
Start: 2022-12-10 | End: 2022-12-10 | Stop reason: HOSPADM

## 2022-12-10 RX ADMIN — CHLORDIAZEPOXIDE HYDROCHLORIDE 25 MG: 25 CAPSULE ORAL at 02:16

## 2022-12-10 RX ADMIN — DIBASIC SODIUM PHOSPHATE, MONOBASIC POTASSIUM PHOSPHATE AND MONOBASIC SODIUM PHOSPHATE 2 TABLET: 852; 155; 130 TABLET ORAL at 04:40

## 2022-12-10 RX ADMIN — PANTOPRAZOLE SODIUM 40 MG: 40 INJECTION, POWDER, LYOPHILIZED, FOR SOLUTION INTRAVENOUS at 09:24

## 2022-12-10 RX ADMIN — SODIUM PHOSPHATE, MONOBASIC, MONOHYDRATE AND SODIUM PHOSPHATE, DIBASIC, ANHYDROUS 15 MMOL: 142; 276 INJECTION, SOLUTION INTRAVENOUS at 10:24

## 2022-12-10 RX ADMIN — SODIUM CHLORIDE: 9 INJECTION, SOLUTION INTRAVENOUS at 05:26

## 2022-12-10 RX ADMIN — FOLIC ACID 1 MG: 1 TABLET ORAL at 09:23

## 2022-12-10 RX ADMIN — TAMSULOSIN HYDROCHLORIDE 0.4 MG: 0.4 CAPSULE ORAL at 04:40

## 2022-12-10 RX ADMIN — CHLORDIAZEPOXIDE HYDROCHLORIDE 25 MG: 25 CAPSULE ORAL at 09:23

## 2022-12-10 RX ADMIN — POTASSIUM BICARBONATE 40 MEQ: 782 TABLET, EFFERVESCENT ORAL at 09:23

## 2022-12-10 RX ADMIN — POLYETHYLENE GLYCOL 3350 17 G: 17 POWDER, FOR SOLUTION ORAL at 04:40

## 2022-12-10 RX ADMIN — FOLIC ACID 5 MG: 1 TABLET ORAL at 12:56

## 2022-12-10 RX ADMIN — THIAMINE HYDROCHLORIDE 250 MG: 100 INJECTION, SOLUTION INTRAMUSCULAR; INTRAVENOUS at 10:28

## 2022-12-10 RX ADMIN — DOCUSATE SODIUM 100 MG: 100 CAPSULE, LIQUID FILLED ORAL at 09:24

## 2022-12-10 RX ADMIN — SODIUM CHLORIDE, PRESERVATIVE FREE 10 ML: 5 INJECTION INTRAVENOUS at 09:24

## 2022-12-10 RX ADMIN — SODIUM CHLORIDE, PRESERVATIVE FREE 10 ML: 5 INJECTION INTRAVENOUS at 09:25

## 2022-12-10 ASSESSMENT — PAIN SCALES - GENERAL
PAINLEVEL_OUTOF10: 0

## 2022-12-10 NOTE — PROGRESS NOTES
Internal Medicine Progress Note    Date: 12/10/2022   Patient: [address] [place] [address] Day: 1      CC: Chest Pain (Known blood clot; states on going for about 2-3 weeks. ) and Emesis (Unable to keep food/fluid down since yesterday )       Interval Hx   Seen at bedside this morning. Was retaining urine overnight. Unable to straight cath per report. Hb dropped to 6.4 and was transfused 1 unit PRBC. Pt reports no complaints this morning. Asked about abdominal pain which he denied. Per his report, last drink was 5 days ago, denies feeling anxious. Lactic acidosis improving 18.2 -> 2.7 this am. AG closed and Bicarb  improving. Continues to be tachycardic 110-120. BP stable. Objective     Vital Signs:  Patient Vitals for the past 8 hrs:   BP Temp Temp src Pulse Resp SpO2 Weight   12/10/22 0624 -- -- -- (!) 112 -- -- --   12/10/22 0600 -- -- -- -- -- -- 140 lb 6.9 oz (63.7 kg)   12/10/22 0355 108/74 99 °F (37.2 °C) Oral (!) 118 18 98 % --   12/10/22 0211 96/72 98.9 °F (37.2 °C) Oral (!) 119 16 -- --   12/10/22 0203 -- -- -- (!) 115 -- -- --   12/10/22 0151 103/72 98.5 °F (36.9 °C) Axillary (!) 118 14 97 % --   12/10/22 0109 104/72 98.8 °F (37.1 °C) Oral (!) 116 14 98 % --   12/10/22 0035 108/77 98.7 °F (37.1 °C) Oral (!) 122 16 98 % --   12/10/22 0020 110/74 98.9 °F (37.2 °C) Oral (!) 121 15 98 % --   12/10/22 0005 107/73 98.2 °F (36.8 °C) Axillary (!) 118 14 99 % --   12/09/22 2350 98/67 98.5 °F (36.9 °C) Oral (!) 116 15 99 % --   12/09/22 2338 98/68 98.3 °F (36.8 °C) Oral (!) 121 15 99 % --   12/09/22 2332 105/69 98.5 °F (36.9 °C) Oral (!) 120 16 100 % --   12/09/22 2312 110/73 98.5 °F (36.9 °C) Oral (!) 120 16 100 % --       Physical Exam  Constitutional:       General: He is not in acute distress. Appearance: He is ill-appearing. He is not diaphoretic. HENT:      Mouth/Throat:      Mouth: Mucous membranes are moist.   Eyes:      Pupils: Pupils are equal, round, and reactive to light. Cardiovascular:      Rate and Rhythm: Regular rhythm. Tachycardia present. Heart sounds: No murmur heard. Pulmonary:      Effort: Pulmonary effort is normal.      Breath sounds: Normal breath sounds. No wheezing or rales. Musculoskeletal:      Right lower leg: No edema. Left lower leg: No edema. Skin:     General: Skin is warm. Capillary Refill: Capillary refill takes less than 2 seconds. Coloration: Skin is jaundiced and pale. Neurological:      Mental Status: He is alert and oriented to person, place, and time. Motor: No weakness. Labs:  CBC:   Recent Labs     12/09/22  0430 12/09/22  1907 12/10/22  0540   WBC 8.6 4.5 3.8*   HGB 7.7* 6.4* 7.5*   HCT 22.7*  22.7* 18.9* 21.3*    126* 125*       BMP:   Recent Labs     12/08/22 2234 12/09/22 0430 12/09/22  1354 12/09/22  2059 12/10/22  0540   *   < > 131* 131* 129*   K 3.3*   < > 3.5 3.8 3.4*   CL 86*   < > 90* 95* 95*   CO2 17*   < > 12* 18* 22   BUN 10   < > 7 9 8   CREATININE <0.5*   < > 0.6* <0.5* <0.5*   GLUCOSE 92   < > 179* 178* 139*   PHOS 2.6  --  2.0* 1.4*  --     < > = values in this interval not displayed. Magnesium:   Recent Labs     12/08/22 2234 12/09/22 0430 12/10/22  0540   MG 1.80 1.70* 1.90     LFT's:   Recent Labs     12/08/22 2234 12/09/22 0430 12/09/22  1355 12/10/22  0540   * 107*  --  96*   ALT 21 20  --  16   BILITOT 3.1* 4.2* 5.3* 4.2*   ALKPHOS 255* 244*  --  174*         U/A:   Recent Labs     12/09/22  1654   COLORU Yellow   PHUR 6.5   WBCUA 10-20*   RBCUA 3-4   MUCUS 1+*   CLARITYU Clear   SPECGRAV 1.020   LEUKOCYTESUR Negative   UROBILINOGEN 4.0*   BILIRUBINUR MODERATE*   BLOODU Negative   GLUCOSEU Negative   AMORPHOUS 2+       Radiology:  IR GUIDED IVC FILTER PLACEMENT   Final Result   IMPRESSION :      Normal IVC anatomy. Normal, patent femoral vein      Placement of filter in the infra-renal IVC. Please note that this is a retrievable filter. Fluoroscopy time : 0.8 minutes   Number of exposures obtained : 1 venographic run, 1 spot image   Blood loss : minimal (less than 5 cc)   Specimens removed : none            CT ABDOMEN PELVIS W IV CONTRAST Additional Contrast? None   Final Result   Impression:   1. Incomplete diffuse hepatic steatosis. 2. Left renal pelvis calculus. 3. Focal area of nodular urothelial thickening of the proximal left ureter just distal to the calculus of the left renal pelvis. This is favored to represent inflammatory lesion but neoplasm is not excluded. Recommend correlation and consider direct    visualization. 4. Mild left hydronephrosis. 5. Lower pole left renal calculi. 6. Wall thickening and submucosal fatty infiltration of the right colon and proximal transverse colon with paracolic stranding consistent with colitis. 7. Mild free fluid pelvis. 8. Left femoral, external iliac, and common iliac vein thrombi. CT CHEST PULMONARY EMBOLISM W CONTRAST   Final Result   Impression:   1. Small peripheral right lower lobe pulmonary embolism new from 10/15/2022, otherwise age-indeterminate. 2. Stable left lower lobe segmental pulmonary embolism. 3. 2.5 x 1.8 cm right lower lobe nodular opacity. Differential could include pulmonary infarct, neoplasm, atelectasis, or infectious/inflammatory lesion. Recommend close follow-up. 4. Centrilobular emphysema. XR CHEST (2 VW)   Final Result   Impression:   No airspace disease. Assessment & Plan     Acute RLL PE and Sub acute LLL PE  Left femoral, external iliac, common iliac vein thrombi  CTPA: Small peripheral right lower lobe pulmonary embolism new from 10/15/2022. right lower lobe nodular opacity concerns for neoplasm. Stable LLL segmental PE. Centrilobular emphysema (pack a day smoker). Not requiring supplemental oxygen. -IVC filter placed 12/09  -Holding anticoagulation due to decreasing Hb, platelets.  Querying hemolysis   -Consult Heme/onc for hypercoagulability work-up. Appreciate recs. DCT pending     Acute Megaloblastic Anemia  Macrocytosis likely 2/2 Alcohol use disorder, Malnutrition  Acute anemia likely 2/2 GI bleed vs hemolysis    Hemoglobin at 7.7, down from 13.5 in October. Patient drinks a 5th of whiskey daily and reports being a heavy drinker for the past 20 years. He reports no hemoptysis or hematemesis. Also no blood in stool or dark stool (patient actually complains of constipation over the past 2 weeks). Occult blood negative. Poor nutritional status. Vit B 12 296 - on lower side  Folate < 2.   -Folic acid 1 mg daily  -Cyanocobalamin 1000 mcg given 12/9  -GI consulted given large drop in Hb though no signs of UGIB or LGIB  -PPI twice daily  -Transfuse for Hb less than 7  -Regular diet   Concern for refeeding   Phosphorus replacement PRN      AGMA  2/2 Lactic acidosis likely 2/2 large daily alcohol use, Acute anemia, and PE    High up to 18.2 at the highest. Down to 2.7 today. Bicarb improved from 12 to 22   -IVF  -Bicarb drip discontinued   -Lactic acid every 6 hrs     Nodular urothelial thickening of ureter   Patient reports a history of many kidney stones in the past, particularly on the left side.   -Consult urology   Pending review   -Bladder scan Q shift   Per report pt was retaining overnight     Transaminitis likely 2/2 Alcoholic hepatitis   Alk phos 255, ALT 21, , bili 3.1, direct 1.8  Maddrey's discriminant function: 28.1 - Good prognosis. -continue to monitor, CMP daily     Alcohol use disorder   With Alcoholic starvation ketosis  Anion gap of 29 bicarb of 17, normal creatinine  Drinks 1/5 of whiskey daily, last drink 3 hours before arriving to the ED  -CIWA with Ativan.   -Librium scheduled 25mg every 6 hrs.   -thiamine daily    DVT PPx: Holding AC due to severe anemia and concern for hemolysis  Diet: ADULT DIET;  Full Liquid  ADULT ORAL NUTRITION SUPPLEMENT; Lunch, Breakfast, Dinner; Standard High Calorie/High Protein Oral Supplement   Code status:  Full Code     ELOS: 3 nights  Barriers to discharge: heme onc review, improvement of clinical status, urology review  Disposition  - Preadmission: home  - Current: [facility]   - Upon discharge: TBD    Will discuss with attending physician Dr. Eric Sandhu MD     _____________________  Hina Jarvis MD   Internal Medicine Resident, PGY-2    Patient seen and examined, plan of care discussed with residents. Agree with their assessment and plan with following addendum:  Patient required transfusion overnight. He feels better this morning. Still undergoing work up of anemia. Hematology went in to see the patient and he said he is leaving today. Saw the patient, explained to him he is not medicalyl ready to discharge due to anemia and electrolyte abnornalities. Explained risks of leaving prior to completion of work up and treatment including death. Patient is awake and alert, voiced understanding of his clinical situation and states he would accept the risks and still want to go.         Eric Sandhu MD

## 2022-12-10 NOTE — PROGRESS NOTES
Pt stating AMA with wife at bedside to take home. AMA signed and pt aware of risks of leaving. MD Attending aware of AMA. Pt's property removed by wife. IV's removed with charting completed. Pt was asked to stay but wanting to go home.

## 2022-12-10 NOTE — PROGRESS NOTES
Tried to perform straight catheterization as ordered but failed. Noted resistance when tried to push through. Resident informed. Resident assessed patient at bedside.  Awaiting orders

## 2022-12-10 NOTE — PROGRESS NOTES
Pt resting in bed with critical phosphorus at 0.5. MD Attending notified. Pending grijalva due to complications this AM. Urology will follow up today.

## 2022-12-10 NOTE — PLAN OF CARE
Problem: Discharge Planning  Goal: Discharge to home or other facility with appropriate resources  Outcome: Progressing     Problem: Pain  Goal: Verbalizes/displays adequate comfort level or baseline comfort level  Outcome: Progressing  Flowsheets (Taken 12/10/2022 9043)  Verbalizes/displays adequate comfort level or baseline comfort level:   Encourage patient to monitor pain and request assistance   Assess pain using appropriate pain scale   Administer analgesics based on type and severity of pain and evaluate response     Problem: Safety - Adult  Goal: Free from fall injury  Outcome: Progressing  Flowsheets (Taken 12/10/2022 4024)  Free From Fall Injury:   Instruct family/caregiver on patient safety   Based on caregiver fall risk screen, instruct family/caregiver to ask for assistance with transferring infant if caregiver noted to have fall risk factors  Note: Fall prevention precaution in place. Call light within reach. Bed alarm ON.  Calls and needs attended     Problem: ABCDS Injury Assessment  Goal: Absence of physical injury  Outcome: Progressing  Flowsheets (Taken 12/10/2022 1931)  Absence of Physical Injury: Implement safety measures based on patient assessment     Problem: Nutrition Deficit:  Goal: Optimize nutritional status  Outcome: Progressing  Flowsheets (Taken 12/10/2022 3621)  Nutrient intake appropriate for improving, restoring, or maintaining nutritional needs:   Assess nutritional status and recommend course of action   Monitor oral intake, labs, and treatment plans   Recommend appropriate diets, oral nutritional supplements, and vitamin/mineral supplements   Order, calculate, and assess calorie counts as needed  Note: Encourage to increase food and fluid intake

## 2022-12-10 NOTE — CONSULTS
Urology Attending Consult Note      Reason for Consultation: Nephrolithiasis, retention    History: 37 yo M with multiple medical issues and history of orchitis and known nephrolithiasis admitted to [address] [place] [address] with acute PE and lactic acidosis 2/2 alcohol abuse. CT scan abd/pelvis revealed a 7mm L renal pelvis stone and 1cm L inferior pole renal stone with mild L hydro and small area of proximal ureteral enhancement. Bladder distended on CT. He initially was voiding well but yesterday started to develop AUR. Staff has been unable to [facility] and insert indwelling catheter. He apparently has had history of difficult grijalva placements prior to this hospitalization. Not currently admitting L flank pain or abdominal pain. Family History, Social History, Review of Systems:  Reviewed and agreed to as per chart    Vitals:  /68   Pulse (!) 111   Temp 98.2 °F (36.8 °C) (Oral)   Resp 18   Ht 6' 1\" (1.854 m)   Wt 140 lb 6.9 oz (63.7 kg)   SpO2 98%   BMI 18.53 kg/m²   Temp  Av.5 °F (36.9 °C)  Min: 98.2 °F (36.8 °C)  Max: 99 °F (37.2 °C)    Intake/Output Summary (Last 24 hours) at 12/10/2022 1006  Last data filed at 12/10/2022 2605  Gross per 24 hour   Intake 504 ml   Output 1970 ml   Net -1466 ml         Physical:  Well developed, well nourished in no acute distress  Mood indicates no abnormalities. Pt doesnt appear depressed  Orientated to time and place  Neck is supple, trachea is midline  Respiratory effort is normal  Cardiovascular show no extremity swelling  Abdomen no masses or hernias are palpated, there is no tenderness. Liver and Spleen appear normal.  Skin show no abnormal lesions  Lymph nodes are not palpated in the inguinal, neck, or axillary area.      Male :  Penis appears normal and circumcised  Mild hypospadias  Scrotum appears normal and both testicles appear normal in size and location    Labs:  WBC:    Lab Results   Component Value Date/Time    WBC 3.8 12/10/2022 05:40 AM Hemoglobin/Hematocrit:    Lab Results   Component Value Date/Time    HGB 7.5 12/10/2022 05:40 AM    HCT 21.3 12/10/2022 05:40 AM     BMP:    Lab Results   Component Value Date/Time     12/10/2022 05:40 AM    K 3.4 12/10/2022 05:40 AM    K 3.5 10/15/2022 04:35 PM    CL 95 12/10/2022 05:40 AM    CO2 22 12/10/2022 05:40 AM    BUN 8 12/10/2022 05:40 AM    LABALBU 2.8 12/10/2022 05:40 AM    CREATININE <0.5 12/10/2022 05:40 AM    CALCIUM 7.7 12/10/2022 05:40 AM    GFRAA >60 10/15/2022 04:35 PM    LABGLOM >60 12/10/2022 05:40 AM     PT/INR:    Lab Results   Component Value Date/Time    PROTIME 19.7 12/10/2022 05:40 AM    INR 1.68 12/10/2022 05:40 AM     PTT:    Lab Results   Component Value Date/Time    APTT 35.5 12/10/2022 05:40 AM   [APTT    Imaging:   Impression   Impression:   1. Incomplete diffuse hepatic steatosis. 2. Left renal pelvis calculus. 3. Focal area of nodular urothelial thickening of the proximal left ureter just distal to the calculus of the left renal pelvis. This is favored to represent inflammatory lesion but neoplasm is not excluded. Recommend correlation and consider direct    visualization. 4. Mild left hydronephrosis. 5. Lower pole left renal calculi. 6. Wall thickening and submucosal fatty infiltration of the right colon and proximal transverse colon with paracolic stranding consistent with colitis. 7. Mild free fluid pelvis. 8. Left femoral, external iliac, and common iliac vein thrombi. Impression/Plan: 37 yo M with multiple medical issues admitted with acute PE. We were consulted for nephrolithiasis and retention.    -Not reporting any  issues at this time. No L flank pain or abdominal pain  -UA negative   -Cr WNL  -He is asymptomatic from a stone standpoint. Will have MD review images but no surgical intervention needed today  -His PVRs have been elevated yesterday and throughout the night.  I did attempt to place a coude catheter but met resistance midway through the urethra and could not advance any further.  Will have  cart brought up to patients room for discussion with MD. For now, continue flomax to help with emptying  -Call with any questions    DEVONTE Manning

## 2022-12-11 LAB
ORGANISM: ABNORMAL
URINE CULTURE, ROUTINE: ABNORMAL

## 2022-12-12 LAB
HAPTOGLOBIN: 78 MG/DL (ref 30–200)
IRON SATURATION: NORMAL % (ref 20–50)
IRON: 133 UG/DL (ref 59–158)
TOTAL IRON BINDING CAPACITY: NORMAL UG/DL (ref 260–445)

## 2022-12-13 LAB
BLOOD BANK DISPENSE STATUS: NORMAL
BLOOD BANK DISPENSE STATUS: NORMAL
BLOOD BANK PRODUCT CODE: NORMAL
BLOOD BANK PRODUCT CODE: NORMAL
BLOOD CULTURE, ROUTINE: NORMAL
BPU ID: NORMAL
BPU ID: NORMAL
CULTURE, BLOOD 2: NORMAL
DESCRIPTION BLOOD BANK: NORMAL
DESCRIPTION BLOOD BANK: NORMAL

## 2022-12-13 NOTE — DISCHARGE SUMMARY
INTERNAL MEDICINE DEPARTMENT  DISCHARGE SUMMARY    Patient ID: Sylvester Vasquez                                             Discharge Date: 12/13/2022   Patient's PCP: No primary care provider on file. Discharge Physician: Jean Demarco MD   Admit Date: 12/8/2022   Admitting Physician: Alfreida Lanes, MD    PROBLEMS DURING HOSPITALIZATION:  Present on Admission:   Chest pain      DISCHARGE DIAGNOSES:  Bilateral Pulmonary embolism  Folate deficiency anemia  Macrocytic anemia  Chronic alcohol use disorder     Hospital Course:      Sylvester Vasquez is a 36 y.o. male, PMHx: kidney stones, alcohol abuse (drinks a fifth of whiskey per-day) he presents to the ED with generalized feeling unwell. He was seen at [address] [place] [address] ED in 10/15 and was found to  have a PE. He left AMA. Pt returned to [facility] ED for chest pain and SOB. Repeat CTPE revealed bilateral PE. He had refused anticoagulation in the past.   He wad admitted for further management of his bilateral pulmonary embolisms, lactic acidosis and anemia evaluation. He received an IVC filter on 12/9. On 12/10 pt reported feeling better and requested to leave AMA. The patient has decided to leave against medical advice, because he cant stay one more night. He has normal mental status and adequate capacity to make medical decisions. The patient was able to understand and state the risks and benefits of hospital admission. Instructed patient to follow up with primary care physician.        Physical Exam:  Vitals: BP 94/67   Pulse (!) 118   Temp 98.5 °F (36.9 °C) (Oral)   Resp 18   Ht 6' 1\" (1.854 m)   Wt 140 lb 6.9 oz (63.7 kg)   SpO2 100%   BMI 18.53 kg/m²   I/O : No intake or output data in the 24 hours ending 12/13/22 9265    CBC:    Lab Results   Component Value Date/Time    WBC 3.8 12/10/2022 05:40 AM    HGB 7.5 12/10/2022 05:40 AM    HCT 21.3 12/10/2022 05:40 AM     12/10/2022 05:40 AM       Renal:    Lab Results   Component Value Date/Time     12/10/2022 05:40 AM    K 3.4 12/10/2022 05:40 AM    K 3.5 10/15/2022 04:35 PM    CL 95 12/10/2022 05:40 AM    CO2 22 12/10/2022 05:40 AM    BUN 8 12/10/2022 05:40 AM    CREATININE <0.5 12/10/2022 05:40 AM    CALCIUM 7.7 12/10/2022 05:40 AM    PHOS 0.6 12/10/2022 10:33 AM       Consults: GI, hematology, interventional radiology  Significant Diagnostic Studies:    CTPE    Treatments:  IVC filter, Blood tranfusion 1 unit      DISCHARGE MEDICATION:     Medication List        START taking these medications      folic acid 1 MG tablet  Commonly known as: FOLVITE  Take 5 tablets by mouth daily     pantoprazole 40 MG tablet  Commonly known as: Protonix  Take 1 tablet by mouth daily     potassium & sodium phosphates 280-160-250 MG Pack  Commonly known as: PHOS-NAK  Take 1 packet by mouth 4 times daily for 5 days     vitamin B-1 100 MG tablet  Commonly known as: THIAMINE  Take 1 tablet by mouth daily            ASK your doctor about these medications      ondansetron 4 MG disintegrating tablet  Commonly known as: Zofran ODT  Take 1 tablet by mouth every 8 hours as needed for Nausea or Vomiting     ondansetron 4 MG tablet  Commonly known as: Zofran  Take 1 tablet by mouth every 8 hours as needed for Nausea     tamsulosin 0.4 MG capsule  Commonly known as: Flomax  Take 1 capsule by mouth daily for 7 doses               Where to Get Your Medications        You can get these medications from any pharmacy    Bring a paper prescription for each of these medications  folic acid 1 MG tablet  pantoprazole 40 MG tablet  potassium & sodium phosphates 280-160-250 MG Pack  vitamin B-1 100 MG tablet           Time Spent on discharge is less than 45 minutes    Signed:  Rozetta Schaumann, MD,  PGY-2  12/13/2022    Patient seen and examined, plan of care discussed with residents. Agree with their assessment and plan with following addendum:  Patient left AMA.        Unknown MD Sky

## 2022-12-28 PROBLEM — K70.10 ALCOHOLIC HEPATITIS: Status: ACTIVE | Noted: 2022-01-01
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
## 2022-12-28 PROBLEM — J18.9 HOSPITAL-ACQUIRED PNEUMONIA: Status: ACTIVE | Noted: 2022-01-01

## 2022-12-28 PROBLEM — D53.9 MACROCYTIC ANEMIA: Status: ACTIVE | Noted: 2022-01-01

## 2022-12-28 PROBLEM — E87.1 HYPONATREMIA: Status: ACTIVE | Noted: 2022-01-01

## 2022-12-28 NOTE — PROGRESS NOTES
Clinical Pharmacy Consult Note       Pharmacy consulted by Dr. Florian Rueda in ED to order first dose of vancomycin. Indication :   sepsis of unknown etiology     Patient Data:     Recent Labs     12/28/22  1635   *   K 4.5   CL 87*   CO2 16*   BUN 12   CREATININE <0.5*       Estimated Creatinine Clearance: 160 mL/min (based on SCr of 0.5 mg/dL). Recent Labs     12/28/22  1635   WBC 15.3*   HGB 9.1*   HCT 28.3*   .4*   *       Height:  @[facility](11:LAST)@  Weight: @[facility] [facility](14:LAST)@    Plan: Will order vancomycin 1250 x 1 dose, to be administered in ED. Please note this consult covers ED vancomycin dose only. If admitting provider would like further vancomycin dose management by pharmacy, please place additional consult order. Thank you for the consult. Please call with questions.   Sundar FloresD  Main Pharmacy: [id number]

## 2022-12-29 NOTE — SIGNIFICANT EVENT
[address]   DISCHARGE SUMMARY - DEATH NOTE   TIME OF DEATH 05:32, 12/29/2022    Patient ID: Ty White                                             Patient's PCP: No primary care provider on file. Admitting Physician: Kristy Reid MD  Admit Date: 12/28/2022   Discharge Physician: Marnie Alvarenga MD  Discharge Date: 12/29/2022     PROBLEMS DURING HOSPITALIZATION:  Patient Active Problem List   Diagnosis    Complicated UTI (urinary tract infection)    Hydronephrosis with ureteropelvic junction (UPJ) obstruction    Elevated lactic acid level    Thrombocytopenia (HCC)    Ureterolithiasis    Sepsis (HCC)    Hyperbilirubinemia    Alcohol abuse, daily use    Alcohol withdrawal (HCC)    TEJAS (acute kidney injury) ([facility] [address]    Chest pain    Hospital-acquired pneumonia    Alcoholic hepatitis    Hyponatremia    Macrocytic anemia       DISCHARGE DIAGNOSES:  1- Ventricular fibrillation cardiac arrest  2- Acute metabolic encephalopathy  3- R sided PNA  4- anion gap metabolic acidosis  5- Transaminitis    Hospital Course:  LIZBETH is a 43yo M with PMHx of alcohol abuse, CKD, chronic bilateral PE, recently admitted for B/l PE s/p IVC filter placement (12/8-12/13) who presents with AMS. Unfortunately, patient coded in the ED once admitted and was intubated. History was obtained per chart review and from his spouse Carita Hamman). Patient's wife states that he lives at home with her and that he has been more confused, fatigued, and generally malaised for the past 2 weeks. She noticed that he became more confused 3 days ago. She states that he was admitted beginning of December and discharged on 12/13. Since discharge, he stopped drinking alcohol. He normally drinks 1 pint of whiskey daily. He did go through withdrawal and detoxification from alcohol at home per wife. Since 3 days ago, he has not been eating well and endorses decreased appetite.   He had nausea and vomiting anytime he did try to eat something. She checked his temp at home and he was afebrile. She called 911 twice yesterday, however patient refused to come to the hospital.  She called 911 again today as she was concerned of worsening altered mental status. Patient has been complaining of intermittent sharp chest pain at home that resolves on it's own. During previous admission, patient had IVC filter placed on 12/9. It does not appear that he has been on anticoagulation. In the ED, patient was initially awake and alert, and able to answer questions. Though, he was confused. On labs, patient had a lactate of 7.9, leukocytosis to 15.3, Na 129, , alk phos 285, ammonia 109, . Troponin negative. Chest XR with new R lung airspace disease. CT head negative. Patient was initially admitted to PCU, however he had V fib cardiac arrest. He received several shocks, epi, and amio. He was placed on an amio gtt, epi gtt, and levo gtt. He remains intubated. He will be admitted to the ICU for post V fib cardiac arrest management and workup. Patient continued to be tachycardic and hypotensive with BP 60-70s/40s. ABG came back pH 7.050, pCO2 28.5, HCO3 7.9. He was hypokalemic to 2.6. He continued on epi gtt, levophed gtt, and vasopressin gtt with MAP ~40. Remains intubated, currently not on sedation. Observed to have myoclonic jerks in his RUE. There is no corneal reflex. Contacted pt's mother, Luther Maria, who is POA. She discussed goals of care with Dr. Charlie Adkins and she wants to WITHDRAW CARE. She states that pt would not want aggressive measures done. She verbalizes understanding of patient's current clinical status and agrees with CODE STATUS CHANGE TO DNR-CC. Patient shortly passed after withdrawal of care.     S: Patient is unresponsive    Physical Exam:  Wt Readings from Last 3 Encounters:   12/28/22 130 lb 14.4 oz (59.4 kg)   12/10/22 140 lb 6.9 oz (63.7 kg)   10/15/22 146 lb (66.2 kg) Body mass index is 17.27 kg/m².     VS: No palpable pulse, no blood pressure   GEN: Pallor with no spontaneous movement   HEENT: Pupils fixed and dilated   CVS: No heart sounds audible   Chest: No audible lung sounds  Neuro: Absent reflexes    Discharge Condition:     Time Spent on discharge is more than 15 minutes    Signed:  Roberto Clark MD PGY1  PGY1 Internal Medicine Resident  2022, 5:33 AM

## 2022-12-29 NOTE — PROGRESS NOTES
Pharmacy Note - Extended Infusion Beta-Lactam Adjustment    Cefepime ordered for treatment of pneumonia (HAP). Per [facility] Extended Infusion Beta-Lactam Policy, cefepime 4905 mg IV every 12 hours will be changed to cefepime 2000 mg every 8 hours EI. Estimated Creatinine Clearance: Estimated Creatinine Clearance: 160 mL/min (based on SCr of 0.5 mg/dL). Dialysis Status, TEJAS, CKD: None  BMI: Body mass index is 17.27 kg/m². Rationale for Adjustment: Agent is renally eliminated and demonstrates time-dependent effect on bacterial eradication. Extended-infusion dosing strategy aims to enhance microbiologic and clinical efficacy. Pharmacy will continue to monitor renal function, cultures and sensitivities (where available) and adjust dose as necessary. Please call with any questions.     Loren Sauer, PharmD  PGY-1 Pharmacy Resident  [facility] [place]  [id number]  12/28/2022   8:38 PM

## 2022-12-29 NOTE — ED NOTES
Pt found unresponsive at 2107, no pulse, code blue called and compressions started by this RN. 2110 first pulse check performed, no pulse found with Pt in VFIB, Shocked delivered and compressions resumed. 2111 1mg of epinephrine given. 2112 second pulse check performed, no pulse found with Pt in VFIB, shock delivered and compressions resumed. 2115 third pulse check performed and wayne machine was applied, no pulse found with Pt in VFIB, shock delivered and compressions resumed with wayne device. 2116 1mg of epinephrine given. 2117 pt intubated with 8.0 at 27cm. 2120 300mg of amiodarone given. 2122 fourth pulse check performed, no pulse with pt in PEA, no shock given and compression resumed. 2125 fifth pulse check performed, no pulse found with pt in PEA, no shock given and compressions resumed. 2126 1mg epinephrine given. 2127 sixth pulse check performed, pulse found with pt in ROSC. Narrator not complete due to this RN calling the code and starting compressions.       Elissa Castorena RN  12/28/22 9206

## 2022-12-29 NOTE — PROGRESS NOTES
Patient admitted to ICU. On 3 of Epi, 1 of Amio. Patient is unresponsive and does not react to pain. ICU residents at bedside. Vent settings upon arrival are RR of 12, TV of 450 and PEEP of 5. Patient is tolerating vent. Patient is unable to follow any commands. Family updated.

## 2022-12-29 NOTE — PROGRESS NOTES
Clinical Pharmacy Progress Note    Vancomycin - Management by Pharmacy    Consult Date(s): 12/28/22  Consulting Provider(s): Michael An    Assessment / Plan   Pneumonia (HAP)- Vancomycin  Concurrent Antimicrobials: cefepime  Day of Vanc Therapy / Ordered Duration: 7 days  Current Dosing Method: Bayesian-Guided AUC Dosing  Therapeutic Goal: -600 mg/L*hr  Current Dose / Plan:   1500 mg every 12 hours  Estimated AUC of 554mg/L.hr and trough of 14.4mg/L  Will continue to monitor clinical condition and make adjustments to regimen as appropriate. Thank you for consulting Pharmacy! Genesis Johnson, PharmD  Main Pharmacy: [id number]        Subjective/Objective:   Viral Jane is a 36 y.o. male who is admitted with PNA. Pharmacy is consulted to dose vancomycin. Ht Readings from Last 1 Encounters:   12/28/22 6' 1\" (1.854 m)     Wt Readings from Last 1 Encounters:   12/28/22 130 lb 14.4 oz (59.4 kg)     Current & Prior Antimicrobial Regimen(s):  cefepime    Vancomycin Level(s) / Doses:    Date Time Dose Type of Level / Level Interpretation                 Note: Serum levels collected for AUC-based dosing may be high if collected in close proximity to the dose administered. This is not necessarily indicative of toxicity. Cultures & Sensitivities:    Date Site Micro Susceptibility / Result                 Recent Labs     12/28/22  1635   CREATININE <0.5*   BUN 12   WBC 15.3*       Estimated Creatinine Clearance: 160 mL/min (based on SCr of 0.5 mg/dL). Additional Lab Values / Findings of Note:    No results for input(s): PROCAL in the last 72 hours.

## 2022-12-29 NOTE — PROGRESS NOTES
Bedside echo done which showed right heart larger than the left ventricle. Possible right heart strain. Hx of PE with IVC filter who went into V fib arrest with rosc achieved. Addendum 05:23     The POA which is his mother was contacted at this time who lives 3 hours away. She was asking if she had to come in to withdraw care and I discussed that she could tell me on the phone if she want to withdraw care. She understood the pathophysiology of what was happening and the prognosis being poor and she agreed with withdrawing care. The mother had said that the patient would not want such aggressive care especially in this state that he is currently in. I answered all of her questions and she would like to be updated after the patient was terminally extubated. We will follow-up with family and answered all of her questions.     CODE STATUS change DNR CC

## 2022-12-29 NOTE — PROGRESS NOTES
Progress Note    Admit Date: 12/28/2022  Day: 1  Diet: Diet NPO    CC: AMS, cardiac arrest    Interval history:  Overnight, patient tachycardic and hypotensive with BP 60-70s/40s. ABG came back pH 7.050, pCO2 28.5, HCO3 7.9. He was hypokalemic to 2.6. He continued on epi gtt, levophed gtt, and vasopressin gtt with MAP ~50-55. Remains intubated, currently not on sedation. Observed to have myoclonic jerks in his RUE. There is no corneal reflex. Attempted to contact mother Pink Loveless), however was not able to get a hold of her. Will re-attempt to discuss goals of care and code status. UPDATE: Contacted pt's mother, Jana Hargrove, who is POA. She discussed goals of care with Dr. Neel Choi and she wants to WITHDRAW CARE. She states that pt would not want aggressive measures done. She verbalizes understanding of patient's current clinical status and agrees with CODE STATUS CHANGE TO DNR-CC. HPI:  Oley Lanes is a 43yo M with PMHx of alcohol abuse, CKD, chronic bilateral PE, recently admitted for B/l PE s/p IVC filter placement (12/8-12/13) who presents with AMS. Unfortunately, patient coded in the ED once admitted and was intubated. History was obtained per chart review and from his spouse Arturo Flores). Patient's wife states that he lives at home with her and that he has been more confused, fatigued, and generally malaised for the past 2 weeks. She noticed that he became more confused 3 days ago. She states that he was admitted beginning of December and discharged on 12/13. Since discharge, he stopped drinking alcohol. He normally drinks 1 pint of whiskey daily. He did go through withdrawal and detoxification from alcohol at home per wife. Since 3 days ago, he has not been eating well and endorses decreased appetite. He had nausea and vomiting anytime he did try to eat something. She checked his temp at home and he was afebrile.   She called 911 twice yesterday, however patient refused to come to the hospital.  She called 911 again today as she was concerned of worsening altered mental status. Patient has been complaining of intermittent sharp chest pain at home that resolves on it's own. During previous admission, patient had IVC filter placed on 12/9. It does not appear that he has been on anticoagulation. In the ED, patient was initially awake and alert, and able to answer questions. Though, he was confused. On labs, patient had a lactate of 7.9, leukocytosis to 15.3, Na 129, , alk phos 285, ammonia 109, . Troponin negative. Chest XR with new R lung airspace disease. CT head negative. Patient was initially admitted to PCU, however he had V fib cardiac arrest. He received several shocks, epi, and amio. He was placed on an amio gtt, epi gtt, and levo gtt. He remains intubated. He will be admitted to the ICU for post V fib cardiac arrest management and workup.     Medications:     Scheduled Meds:   potassium chloride        sodium chloride flush  5-40 mL IntraVENous 2 times per day    cefepime  2,000 mg IntraVENous Q8H    vancomycin  1,500 mg IntraVENous Q12H    pantoprazole  40 mg IntraVENous Daily    heparin (porcine)  80 Units/kg IntraVENous Once     Continuous Infusions:   phenylephrine (DEBBIE-SYNEPHRINE) 50mg/250mL infusion 10 mcg/min (12/29/22 0127)    sodium bicarbonate infusion 50 mL/hr at 12/29/22 0215    EPINEPHrine 20 mcg/min (12/29/22 0241)    vasopressin (Septic Shock) infusion 0.03 Units/min (12/29/22 0405)    electrolyte-A 100 mL/hr at 12/28/22 1801    sodium chloride      amiodarone      propofol      fentaNYL      norepinephrine 5 mcg/min (12/28/22 2254)    heparin (PORCINE) Infusion       PRN Meds:perflutren lipid microspheres, potassium chloride, sodium chloride flush, sodium chloride, ondansetron **OR** ondansetron, acetaminophen **OR** acetaminophen, aluminum & magnesium hydroxide-simethicone, heparin (porcine), heparin (porcine)    Objective:   Vitals:   T-max:  Patient Vitals for the past 8 hrs: BP Temp Temp src Pulse Resp SpO2   12/29/22 0345 -- -- -- (!) 110 26 95 %   12/29/22 0325 -- -- -- (!) 109 26 --   12/29/22 0315 (!) 76/60 -- -- (!) 112 28 96 %   12/29/22 0310 -- -- -- (!) 113 25 95 %   12/29/22 0255 96/75 -- -- (!) 110 24 --   12/29/22 0240 (!) 79/68 -- -- (!) 108 20 (!) 73 %   12/29/22 0225 (!) 74/57 -- -- (!) 107 16 (!) 75 %   12/29/22 0210 (!) 79/64 -- -- (!) 109 21 (!) 70 %   12/29/22 0155 80/66 -- -- (!) 107 24 (!) 82 %   12/29/22 0140 83/66 -- -- (!) 108 19 (!) 82 %   12/29/22 0125 84/63 -- -- (!) 109 19 (!) 77 %   12/29/22 0110 86/66 -- -- (!) 108 19 100 %   12/29/22 0100 -- -- -- (!) 109 28 100 %   12/29/22 0055 92/66 -- -- (!) 108 24 (!) 74 %   12/29/22 0040 88/70 -- -- (!) 108 24 --   12/29/22 0025 98/72 -- -- (!) 107 20 --   12/29/22 0010 96/71 -- -- (!) 109 24 --   12/28/22 2355 (!) 76/64 -- -- (!) 109 21 99 %   12/28/22 2340 (!) 60/41 -- -- (!) 103 18 100 %   12/28/22 2325 (!) 79/63 -- -- (!) 101 17 --   12/28/22 2310 (!) 76/62 -- -- 99 14 --   12/28/22 2255 (!) 68/55 -- -- 97 15 --   12/28/22 2245 (!) 68/56 97.1 °F (36.2 °C) Axillary 94 14 --   12/28/22 2244 -- -- -- 95 15 --   12/28/22 2240 -- -- -- (!) 105 20 --   12/28/22 2115 -- -- -- -- -- (!) 76 %       Intake/Output Summary (Last 24 hours) at 12/29/2022 0407  Last data filed at 12/28/2022 2003  Gross per 24 hour   Intake 550 ml   Output --   Net 550 ml       Review of Systems  Unable to obtain d/t mental status    Physical Exam  Constitutional:       Comments: Jaundiced, chronically ill appearing. Thin, frail. Intubated. HENT:      Mouth/Throat:      Mouth: Mucous membranes are dry. Cardiovascular:      Rate and Rhythm: Tachycardia present. Pulmonary:      Comments: Mechanical breath sounds. Abdominal:      General: Bowel sounds are normal. There is no distension. Palpations: Abdomen is soft. There is no mass. Musculoskeletal:      Right lower leg: No edema. Left lower leg: No edema.    Skin:     General: Skin is warm and dry. Coloration: Skin is jaundiced. LABS:    CBC:   Recent Labs     12/28/22  1635 12/29/22  0204   WBC 15.3* 25.2*   HGB 9.1* 7.2*   HCT 28.3* 23.7*   * 380   .4* 109.9*     Renal:    Recent Labs     12/28/22  1635 12/29/22  0204   * 130*   K 4.5 2.6*  2.6*   CL 87* 88*   CO2 16* 8*   BUN 12 17   CREATININE <0.5* 1.2   GLUCOSE 157* 150*   CALCIUM 8.4 8.8   MG  --  2.40   PHOS  --  7.1*   ANIONGAP 26* 34*     Hepatic:   Recent Labs     12/28/22  1635 12/29/22  0204   *  --    ALT 22  --    BILITOT 4.3*  --    PROT 8.2  --    LABALBU 3.0* 2.5*   ALKPHOS 285*  --      Troponin:   Recent Labs     12/28/22  1635   TROPONINI <0.01     BNP: No results for input(s): BNP in the last 72 hours. Lipids: No results for input(s): CHOL, HDL in the last 72 hours. Invalid input(s): LDLCALCU, TRIGLYCERIDE  ABGs:    Recent Labs     12/29/22  0142   PHART 7.050*   HVM0UOY 28.5*   PO2ART 321.9*   AMV7MAJ 7.9*   BEART -23*   W1RUTSUG 100   CWO0ZFZ 9       INR:   Recent Labs     12/28/22  1635   INR 1.29*     Lactate:   Recent Labs     12/29/22  0142   LACTATE 17.88*     Cultures:  -----------------------------------------------------------------  RAD:   XR CHEST PORTABLE   Final Result   1. Right internal jugular central line in satisfactory position of the distal SVC. 2. No pneumothorax   3. Stable position of endotracheal tube   4. Right upper lobe peripheral airspace disease and lower lobe groundglass density is redemonstrated      XR CHEST PORTABLE   Final Result   1. Worsening of groundglass airspace disease   2. Endotracheal intubation with no evidence of pneumothorax      CT HEAD WO CONTRAST   Final Result      No acute intracranial hemorrhage or mass effect. XR CHEST PORTABLE   Final Result      New multifocal airspace disease in the right lung. Correlate for pneumonia. Follow-up is recommended to document resolution.       US ABDOMEN COMPLETE    (Results Pending) CTA CHEST W CONTRAST    (Results Pending)       Assessment/Plan:   Suzie Ward is a 43yo M with PMHx of alcohol abuse, CKD, chronic bilateral PE, recently admitted for B/l PE s/p IVC filter placement (12/8-12/13) who presents with AMS. Cardiac arrest, V fib  Likely 2/2 to PE, R heart strain, arrhythmia  Patient with several ED visits over the past 1 month regarding R sided chest pain with associated shortness of breath. He was recently admitted 12/8-12/13 and found to have bilateral PE on CTPA. [facility] however d/t acute anemia, AC was stopped. IVC filter placed on 12/9. Initial VBG with pH 7.312, pCO2 36.8, and HCO3 18.6. Patient went into V fib cardiac arrest while in the ED. S/p several shocks, epi, amio. Then he went into PEA with ROSC obtained at 6th pulse check. Patient started on epi gtt, levo gtt, and amio gtt. Central line and A-line placed. Repeat ABG --> pH 7.05, pCO2 28.5, HCO3 7.9. Bedside echo with evidence of R heart enlargement greater than left. , suggestive of R heart strain 2/2 to PE.  - f/u CTPA  - start heparin bolus and heparin gtt for possible PE  - cont levo gtt, epi gtt, vasopressin gtt. Maintain MAP > 55mmHg  - start bicarb gtt  - currently off sedation  - f/u echo  - f/u BMP, lactate Q2hr  - replete electrolytes  *UPDATE: POA, pt's mother Aston Underwood, would like CODE STATUS CHANGE TO DNR-CC and verbalized withdrawal of care. Acute metabolic encephalopathy  Likely 2/2 to PNA  Patient with worsening confusion, fatigue, loss of appetite over the last three days. Chest XR with new R sided airspace disease concerning for PNA. S/p cefepime and vancomycin in the ED.  - cont cefepime 2g Q8hr (12/29- )  - cont vancomycin 1.5g Q12hr (12/29- )  - f/u respiratory cultures, viral panel, MRSA swab, legionella, strep     Anion gap metabolic acidosis  Lactate at presentation 7.9 -> 5.0 -> 17.88.  Likely 2/2 to PE and worsened by cardiac arrest.  - start bicarb gtt  - management as above  - infectious component, cont abx vancomycin and cefepime  - continuous IVF  - f/u blood cx x2, urine culture, UA     Transaminitis  Likely 2/2 to alcohol abuse  Patient with extensive alcohol abuse; 1 pint whisky daily for > 20 years. Alk phos 285, ALT 22, , ammonia 109. On exam, patient jaundiced, chronically ill appearing.   - f/u abdominal US  - consider lactulose, rifaximin  - thiamine QD    Code Status: DNR-CC  FEN: NPO  PPX: heparin  DISPO: ICU    Rena Zapata MD, PGY-1  22  4:07 AM    This patient has been staffed and discussed with Dr. lAexi Clark MD.    THE [facility] [place]    Power of  for patient decided to transition to comfort care overnight. Patient subsequently  after removal from ventilator. Patient was  prior to my arrival at the hospital.  Therefore I did not see patient and this is a nonbillable note.     Bee Spencer MD

## 2022-12-29 NOTE — PROCEDURES
Conrado Cantu is a 36 y.o. male patient. 1. Pneumonia due to infectious organism, unspecified laterality, unspecified part of lung    2. Tachycardia    3. Sepsis, due to unspecified organism, unspecified whether acute organ dysfunction present ([facility] [address]    4. Hypotension, unspecified hypotension type      Past Medical History:   Diagnosis Date    Bladder infection     Chronic kidney disease     ETOH abuse      Blood pressure 104/65, pulse 95, temperature 98.5 °F (36.9 °C), resp. rate 15, height 6' 1\" (1.854 m), weight 130 lb 14.4 oz (59.4 kg), SpO2 (!) 76 %. Insert Arterial Line    Date/Time: 12/29/2022 1:03 AM  Performed by: Elda Feldman MD  Authorized by: Altagracia Parks MD   Consent: The procedure was performed in an emergent situation. Verbal consent obtained. Written consent not obtained. Risks and benefits: risks, benefits and alternatives were discussed  Consent given by: spouse  Patient understanding: patient states understanding of the procedure being performed  Patient consent: the patient's understanding of the procedure matches consent given  Procedure consent: procedure consent matches procedure scheduled  Relevant documents: relevant documents present and verified  Test results: test results available and properly labeled  Site marked: the operative site was marked  Imaging studies: imaging studies available  Required items: required blood products, implants, devices, and special equipment available  Patient identity confirmed: arm band and hospital-assigned identification number  Time out: Immediately prior to procedure a \"time out\" was called to verify the correct patient, procedure, equipment, support staff and site/side marked as required. Preparation: Patient was prepped and draped in the usual sterile fashion.   Indications: hemodynamic monitoring  Location: right femoral    Sedation:  Patient sedated: no    Needle gauge: 20  Seldinger technique: Seldinger technique used  Number of attempts: 2  Post-procedure: line sutured and dressing applied  Patient tolerance: patient tolerated the procedure well with no immediate complications  Comments: EBL < 10 mL.         Carlene Esparza MD PGY1  12/29/2022

## 2022-12-29 NOTE — H&P
ICU HISTORY AND 2025 [facility] Day: 1  ICU Day: 1                                                         Code:Full Code  Admit Date: 12/28/2022  PCP: No primary care provider on file. CC: AMS    HISTORY OF PRESENT ILLNESS:   LIZBETH is a 45yo M with PMHx of alcohol abuse, CKD, chronic bilateral PE, recently admitted for B/l PE s/p IVC filter placement (12/8-12/13) who presents with AMS. Unfortunately, patient coded in the ED once admitted and was intubated. History was obtained per chart review and from his spouse Griselda Hua). Patient's wife states that he lives at home with her and that he has been more confused, fatigued, and generally malaised for the past 2 weeks. She noticed that he became more confused 3 days ago. She states that he was admitted beginning of December and discharged on 12/13. Since discharge, he stopped drinking alcohol. He normally drinks 1 pint of whiskey daily. He did go through withdrawal and detoxification from alcohol at home per wife. Since 3 days ago, he has not been eating well and endorses decreased appetite. He had nausea and vomiting anytime he did try to eat something. She checked his temp at home and he was afebrile. She called 911 twice yesterday, however patient refused to come to the hospital.  She called 911 again today as she was concerned of worsening altered mental status. Patient has been complaining of intermittent sharp chest pain at home that resolves on it's own. During previous admission, patient had IVC filter placed on 12/9. It does not appear that he has been on anticoagulation. In the ED, patient was initially awake and alert, and able to answer questions. Though, he was confused. On labs, patient had a lactate of 7.9, leukocytosis to 15.3, Na 129, , alk phos 285, ammonia 109, . Troponin negative. Chest XR with new R lung airspace disease. CT head negative.  Patient was initially admitted to PCU, however he had V fib cardiac arrest. He received several shocks, epi, and amio. He was placed on an amio gtt, epi gtt, and levo gtt. He remains intubated. He will be admitted to the ICU for post V fib cardiac arrest management and workup. PAST HISTORY:     Past Medical History:   Diagnosis Date    Bladder infection     Chronic kidney disease     ETOH abuse        Past Surgical History:   Procedure Laterality Date    CYSTOSCOPY  10/04/2016    CYSTOSCOPY, DVIU, LEFT RETROGRADE AND STENT PLACMENT    IR IVC FILTER PLACEMENT W IMAGING  12/9/2022    IR IVC FILTER PLACEMENT W IMAGING 12/9/2022 [facility] SPECIAL PROCEDURES    KIDNEY SURGERY  kidney stones       SocialHistory:   The patient lives at home with wife    Alcohol: 1 pint of whisky daily  Illicit drugs: no use  Tobacco:  1 pack daily    Family History:  History reviewed. No pertinent family history. MEDICATIONS:     No current facility-administered medications on file prior to encounter.      Current Outpatient Medications on File Prior to Encounter   Medication Sig Dispense Refill    folic acid (FOLVITE) 1 MG tablet Take 5 tablets by mouth daily (Patient not taking: Reported on 12/28/2022) 30 tablet 3    vitamin B-1 (THIAMINE) 100 MG tablet Take 1 tablet by mouth daily (Patient not taking: Reported on 12/28/2022) 30 tablet 3    potassium & sodium phosphates (PHOS-NAK) 280-160-250 MG PACK Take 1 packet by mouth 4 times daily for 5 days 20 packet 0    pantoprazole (PROTONIX) 40 MG tablet Take 1 tablet by mouth daily (Patient not taking: Reported on 12/28/2022) 30 tablet 0    tamsulosin (FLOMAX) 0.4 MG capsule Take 1 capsule by mouth daily for 7 doses 7 capsule 0    ondansetron (ZOFRAN) 4 MG tablet Take 1 tablet by mouth every 8 hours as needed for Nausea (Patient not taking: No sig reported) 20 tablet 0    ondansetron (ZOFRAN ODT) 4 MG disintegrating tablet Take 1 tablet by mouth every 8 hours as needed for Nausea or Vomiting (Patient not taking: No sig reported) 15 tablet 0         Scheduled Meds:   sodium chloride flush  5-40 mL IntraVENous 2 times per day    cefepime  2,000 mg IntraVENous Q8H    vancomycin  1,500 mg IntraVENous Q12H    pantoprazole  40 mg IntraVENous Daily    heparin (porcine)  80 Units/kg IntraVENous Once      Continuous Infusions:   electrolyte-A 100 mL/hr at 12/28/22 1801    sodium chloride      EPINEPHrine 1 mcg/min (12/28/22 2146)    amiodarone      Followed by    amiodarone      propofol      fentaNYL      norepinephrine 5 mcg/min (12/28/22 2254)    heparin (PORCINE) Infusion       PRN Meds:sodium chloride flush, sodium chloride, ondansetron **OR** ondansetron, acetaminophen **OR** acetaminophen, aluminum & magnesium hydroxide-simethicone, heparin (porcine), heparin (porcine)    Allergies: Allergies   Allergen Reactions    Ampicillin        REVIEW OF SYSTEMS:       History obtained from unobtainable from patient due to mental status. Review of Systems  Unable to obtain d/t mental status. PHYSICAL EXAM:       Vitals: /65   Pulse (!) 109   Temp 98.5 °F (36.9 °C)   Resp 28   Ht 6' 1\" (1.854 m)   Wt 130 lb 14.4 oz (59.4 kg)   SpO2 100%   BMI 17.27 kg/m²     I/O:    Intake/Output Summary (Last 24 hours) at 12/29/2022 0108  Last data filed at 12/28/2022 2003  Gross per 24 hour   Intake 550 ml   Output --   Net 550 ml     I/O this shift: In: 550 [IV Piggyback:550]  Out: -   No intake/output data recorded. Physical Examination:     Physical Exam  Constitutional:       Comments: Jaundiced, chronically ill appearing. Thin, frail. Intubated. HENT:      Mouth/Throat:      Mouth: Mucous membranes are dry. Cardiovascular:      Rate and Rhythm: Tachycardia present. Pulmonary:      Comments: Mechanical breath sounds. Abdominal:      General: Bowel sounds are normal. There is no distension. Palpations: Abdomen is soft. There is no mass. Musculoskeletal:      Right lower leg: No edema. Left lower leg: No edema.    Skin: pneumonia. Follow-up is recommended to document resolution. US ABDOMEN COMPLETE    (Results Pending)   CTA CHEST W CONTRAST    (Results Pending)       EKG: NSR, tachycardia  Echo: pending. Bedside echo with evidence of R heart enlargement compared to right. Concerning for possible R heart strain. Micro:     ASSESSMENT AND PLAN:   Carole Villela is a 45yo M with PMHx of alcohol abuse, CKD, chronic bilateral PE, recently admitted for B/l PE s/p IVC filter placement (12/8-12/13) who presents with AMS. Cardiac arrest, V fib  Likely 2/2 to PE, R heart strain, arrhythmia  Patient with several ED visits over the past 1 month regarding R sided chest pain with associated shortness of breath. He was recently admitted 12/8-12/13 and found to have bilateral PE on CTPA. He was started on [facility], however d/t acute anemia, AC was stopped. IVC filter placed on 12/9. Initial VBG with pH 7.312, pCO2 36.8, and HCO3 18.6. Patient went into V fib cardiac arrest while in the ED. S/p several shocks, epi, amio. Then he went into PEA with ROSC obtained at 6th pulse check. Patient started on epi gtt, levo gtt, and amio gtt. Central line and A-line placed. - f/u CTPA  - start heparin bolus and heparin gtt for possible PE  - cont levo gtt, epi gtt; start vasopressin gtt. Maintain MAP > 65mmHg  - bedside echo with evidence of R heart enlargement greater than left. , suggestive of R heart strain 2/2 to PE.  - f/u repeat ABG --> pH 7.05, pCO2 28.5, HCO3 7.9  - start bicarb gtt  - cont propofol, fentanyl for sedation  - f/u echo    Acute metabolic encephalopathy  Likely 2/2 to PNA  Patient with worsening confusion, fatigue, loss of appetite over the last three days. Chest XR with new R sided airspace disease concerning for PNA.  S/p cefepime and vancomycin in the ED.  - cont cefepime 2g Q8hr (12/29- )  - cont vancomycin 1.5g Q12hr (12/29- )  - f/u respiratory cultures, viral panel, MRSA swab, legionella, strep    Anion gap metabolic acidosis  Lactate at presentation 7.9 -> 5.0 -> 17.88. Likely 2/2 to PE and worsened by cardiac arrest.  - start bicarb gtt  - management at above  - infectious component, cont abx vancomycin and cefepime  - continuous IVF  - f/u blood cx x2, urine culture, UA    Transaminitis  Likely 2/2 to alcohol abuse  Patient with extensive alcohol abuse; 1 pint whisky daily for > 20 years. Alk phos 285, ALT 22, , ammonia 109.  On exam, patient jaundiced, chronically ill appearing.   - f/u abdominal US  - consider lactulose, rifaximin  - thiamine QD    Code Status:Full Code  FEN: NPO  PPX:  heparin  DISPO: ICU    This patient has been staffed and discussed with Suzanne Loyola MD.   -----------------------------  Rena Zapata MD, PGY-1  12/29/2022  1:08 AM

## 2022-12-29 NOTE — SIGNIFICANT EVENT
I personally evaluated the patient at the bedside. Patient was found to be without cardiac activity on monitors, absent brainstem reflexes, no pupillary response, pupils were fixed, dilated, no doll's eyes. Patient was areflexive with no spontaneous respirations or respiratory movements. No palpable pulses throughout. No response to painful stimuli. GCS 3. Time of death called 05:32. Physical Exam:  VS: No palpable pulse, no blood pressure.   Gen: Pallor with no spontaneous movement  HEENT: Pupils fixed and dilated  CVS: No heart sounds audible  Chest: No audible lung sounds  Neuro: Absent reflexes     Stefania Alvarado MD PGY-2

## 2022-12-29 NOTE — ED PROVIDER NOTES
Intubation    Date/Time: 12/28/2022 9:45 PM  Performed by: Elliott Fuentes MD  Authorized by: Kaylynn House MD     Consent:     Consent obtained:  Emergent situation  Pre-procedure details:     Indication: failure to oxygenate, failure to protect airway and failure to ventilate      Patient status:  Unresponsive    Look externally: facial hair and large tongue      Neck mobility: normal      Pharmacologic strategy: none      Induction agents:  None    Paralytics:  None  Procedure details:     Preoxygenation:  Bag valve mask    CPR in progress: yes      Intubation method:  Oral    Intubation technique: video assisted      Laryngoscope blade: Mac 4    Bougie used: no      Grade view: I      Tube size (mm):  8.0    Tube type:  Cuffed    Number of attempts:  2  Placement assessment:     Placement verification: colorimetric ETCO2 and direct visualization    Post-procedure details:     Procedure completion:  Tolerated  Comments:      Patient was preoxygenated with BVM while undergoing compressions. First attempt was made with a DL MAC 3 with poor visualization of the supraglottic space therefore patient was oxygenated with BVM. Second attempt was made with glide scope MAC 4 with high granulated stylette with anterior airway but grade 1 view. Tube was placed at 25 cm at the teeth with good color change on colorimetric ET CO2.         Elliott Fuentes MD  12/28/22 4885

## 2022-12-29 NOTE — PROCEDURES
Elias Dinero is a 36 y.o. male patient. 1. Pneumonia due to infectious organism, unspecified laterality, unspecified part of lung    2. Tachycardia      Past Medical History:   Diagnosis Date    Bladder infection     Chronic kidney disease     ETOH abuse      Blood pressure 104/65, pulse 95, temperature 98.5 °F (36.9 °C), resp. rate 15, height 6' 1\" (1.854 m), weight 130 lb 14.4 oz (59.4 kg), SpO2 (!) 76 %. Central Line    Date/Time: 12/29/2022 12:59 AM  Performed by: Marshall Fair MD  Authorized by: Lorelei Baez MD   Consent: The procedure was performed in an emergent situation. Verbal consent obtained. Written consent not obtained. Risks and benefits: risks, benefits and alternatives were discussed  Consent given by: spouse  Patient understanding: patient states understanding of the procedure being performed  Patient consent: the patient's understanding of the procedure matches consent given  Procedure consent: procedure consent matches procedure scheduled  Relevant documents: relevant documents present and verified  Test results: test results available and properly labeled  Site marked: the operative site was marked  Imaging studies: imaging studies available  Required items: required blood products, implants, devices, and special equipment available  Patient identity confirmed: arm band and hospital-assigned identification number  Time out: Immediately prior to procedure a \"time out\" was called to verify the correct patient, procedure, equipment, support staff and site/side marked as required.   Indications: central pressure monitoring and vascular access    Sedation:  Patient sedated: no    Location details: right internal jugular  Patient position: flat  Catheter type: triple lumen  Catheter size: 7 Fr  Pre-procedure: landmarks identified  Ultrasound guidance: yes  Sterile ultrasound techniques: sterile gel and sterile probe covers were used  Number of attempts: 1  Successful placement:

## 2022-12-30 LAB
ORGANISM: ABNORMAL
URINE CULTURE, ROUTINE: ABNORMAL

## 2023-01-01 LAB
BLOOD CULTURE, ROUTINE: ABNORMAL
BLOOD CULTURE, ROUTINE: ABNORMAL
CULTURE, BLOOD 2: NORMAL
ORGANISM: ABNORMAL
REPORT: NORMAL